# Patient Record
Sex: MALE | Race: WHITE | NOT HISPANIC OR LATINO | URBAN - METROPOLITAN AREA
[De-identification: names, ages, dates, MRNs, and addresses within clinical notes are randomized per-mention and may not be internally consistent; named-entity substitution may affect disease eponyms.]

---

## 2019-05-29 ENCOUNTER — APPOINTMENT (RX ONLY)
Dept: URBAN - METROPOLITAN AREA CLINIC 24 | Facility: CLINIC | Age: 74
Setting detail: DERMATOLOGY
End: 2019-05-29

## 2019-05-29 DIAGNOSIS — L82.1 OTHER SEBORRHEIC KERATOSIS: ICD-10-CM

## 2019-05-29 DIAGNOSIS — L57.0 ACTINIC KERATOSIS: ICD-10-CM

## 2019-05-29 DIAGNOSIS — D22 MELANOCYTIC NEVI: ICD-10-CM

## 2019-05-29 PROBLEM — D22.5 MELANOCYTIC NEVI OF TRUNK: Status: ACTIVE | Noted: 2019-05-29

## 2019-05-29 PROBLEM — I25.10 ATHEROSCLEROTIC HEART DISEASE OF NATIVE CORONARY ARTERY WITHOUT ANGINA PECTORIS: Status: ACTIVE | Noted: 2019-05-29

## 2019-05-29 PROBLEM — D22.39 MELANOCYTIC NEVI OF OTHER PARTS OF FACE: Status: ACTIVE | Noted: 2019-05-29

## 2019-05-29 PROBLEM — M12.9 ARTHROPATHY, UNSPECIFIED: Status: ACTIVE | Noted: 2019-05-29

## 2019-05-29 PROBLEM — D22.71 MELANOCYTIC NEVI OF RIGHT LOWER LIMB, INCLUDING HIP: Status: ACTIVE | Noted: 2019-05-29

## 2019-05-29 PROBLEM — E13.9 OTHER SPECIFIED DIABETES MELLITUS WITHOUT COMPLICATIONS: Status: ACTIVE | Noted: 2019-05-29

## 2019-05-29 PROBLEM — D22.61 MELANOCYTIC NEVI OF RIGHT UPPER LIMB, INCLUDING SHOULDER: Status: ACTIVE | Noted: 2019-05-29

## 2019-05-29 PROCEDURE — 99203 OFFICE O/P NEW LOW 30 MIN: CPT | Mod: 25

## 2019-05-29 PROCEDURE — ? SHAVE REMOVAL

## 2019-05-29 PROCEDURE — 11300 SHAVE SKIN LESION 0.5 CM/<: CPT | Mod: 59

## 2019-05-29 PROCEDURE — 11102 TANGNTL BX SKIN SINGLE LES: CPT

## 2019-05-29 PROCEDURE — ? COUNSELING

## 2019-05-29 PROCEDURE — ? BIOPSY BY SHAVE METHOD

## 2019-05-29 PROCEDURE — ? LIQUID NITROGEN

## 2019-05-29 PROCEDURE — 17000 DESTRUCT PREMALG LESION: CPT | Mod: 59

## 2019-05-29 ASSESSMENT — LOCATION DETAILED DESCRIPTION DERM
LOCATION DETAILED: RIGHT PROXIMAL PRETIBIAL REGION
LOCATION DETAILED: SUPERIOR THORACIC SPINE
LOCATION DETAILED: LEFT MID PREAURICULAR CHEEK
LOCATION DETAILED: LEFT INFERIOR MEDIAL UPPER BACK
LOCATION DETAILED: PERIUMBILICAL SKIN
LOCATION DETAILED: RIGHT LATERAL SHOULDER
LOCATION DETAILED: RIGHT MEDIAL INFERIOR CHEST
LOCATION DETAILED: LEFT MEDIAL FOREHEAD

## 2019-05-29 ASSESSMENT — LOCATION SIMPLE DESCRIPTION DERM
LOCATION SIMPLE: RIGHT PRETIBIAL REGION
LOCATION SIMPLE: LEFT UPPER BACK
LOCATION SIMPLE: RIGHT SHOULDER
LOCATION SIMPLE: CHEST
LOCATION SIMPLE: LEFT FOREHEAD
LOCATION SIMPLE: UPPER BACK
LOCATION SIMPLE: LEFT CHEEK
LOCATION SIMPLE: ABDOMEN

## 2019-05-29 ASSESSMENT — LOCATION ZONE DERM
LOCATION ZONE: LEG
LOCATION ZONE: TRUNK
LOCATION ZONE: FACE
LOCATION ZONE: ARM

## 2019-05-29 NOTE — PROCEDURE: SHAVE REMOVAL
Billing Type: Third-Party Bill
Bill [id number] For Specimen Handling/Conveyance To Laboratory?: no
Size Of Lesion In Cm (Required): 0.5
X Size Of Lesion In Cm (Optional): 0
Was A Bandage Applied: Yes
Biopsy Method: Dermablade
Path Notes (To The Dermatopathologist): Please check margins.
Notification Instructions: Patient will be notified of biopsy results. However, patient instructed to call the office if not contacted within 2 weeks.
Detail Level: Detailed
Anesthesia Volume In Cc: 0.4
Wound Care: Vaseline
Consent was obtained from the patient. The risks and benefits to therapy were discussed in detail. Specifically, the risks of infection, scarring, bleeding, prolonged wound healing, incomplete removal, allergy to anesthesia, nerve injury and recurrence were addressed. Prior to the procedure, the treatment site was clearly identified and confirmed by the patient. All components of Universal Protocol/PAUSE Rule completed.
Medical Necessity Information: It is in your best interest to select a reason for this procedure from the list below. All of these items fulfill various CMS LCD requirements except the new and changing color options.
Hemostasis: Electrodesiccation
Anesthesia Type: 1% lidocaine with epinephrine and a 1:10 solution of 8.4% sodium bicarbonate
Post-Care Instructions: I reviewed with the patient in detail post-care instructions. Patient is to keep the biopsy site dry overnight, and then apply bacitracin twice daily until healed. Patient may apply hydrogen peroxide soaks to remove any crusting.

## 2019-05-29 NOTE — PROCEDURE: LIQUID NITROGEN
Render Post-Care Instructions In Note?: no
Duration Of Freeze Thaw-Cycle (Seconds): 4
Number Of Freeze-Thaw Cycles: 1 freeze-thaw cycle
Detail Level: Simple
Consent: The patient's consent was obtained including but not limited to risks of crusting, scabbing, blistering, scarring, darker or lighter pigmentary change, recurrence, incomplete removal and infection.
Post-Care Instructions: I reviewed with the patient in detail post-care instructions. Patient is to wear sunprotection, and avoid picking at any of the treated lesions. Pt may apply Vaseline to crusted or scabbing areas.

## 2019-05-29 NOTE — PROCEDURE: BIOPSY BY SHAVE METHOD
Destruction After The Procedure: No
Curettage Text: The wound bed was treated with curettage after the biopsy was performed.
Hemostasis: Aluminum Chloride
Dressing: bandage
Silver Nitrate Text: The wound bed was treated with silver nitrate after the biopsy was performed.
Biopsy Type: H and E
Biopsy Method: Dermablade
Detail Level: Detailed
Accession #: pc
Additional Anesthesia Volume In Cc (Will Not Render If 0): 0
Type Of Destruction Used: Curettage
Wound Care: Petrolatum
Electrodesiccation And Curettage Text: The wound bed was treated with electrodesiccation and curettage after the biopsy was performed.
Depth Of Biopsy: dermis
Was A Bandage Applied: Yes
Anesthesia Type: 1% lidocaine with 1:100,000 epinephrine and a 1:6 solution of 8.4% sodium bicarbonate
Cryotherapy Text: The wound bed was treated with cryotherapy after the biopsy was performed.
Anesthesia Volume In Cc: 0.5
Electrodesiccation Text: The wound bed was treated with electrodesiccation after the biopsy was performed.
Billing Type: Third-Party Bill

## 2020-06-05 ENCOUNTER — APPOINTMENT (RX ONLY)
Dept: URBAN - METROPOLITAN AREA CLINIC 24 | Facility: CLINIC | Age: 75
Setting detail: DERMATOLOGY
End: 2020-06-05

## 2020-06-05 DIAGNOSIS — L82.1 OTHER SEBORRHEIC KERATOSIS: ICD-10-CM

## 2020-06-05 DIAGNOSIS — L57.0 ACTINIC KERATOSIS: ICD-10-CM

## 2020-06-05 DIAGNOSIS — D22 MELANOCYTIC NEVI: ICD-10-CM

## 2020-06-05 PROBLEM — H91.90 UNSPECIFIED HEARING LOSS, UNSPECIFIED EAR: Status: ACTIVE | Noted: 2020-06-05

## 2020-06-05 PROBLEM — D22.39 MELANOCYTIC NEVI OF OTHER PARTS OF FACE: Status: ACTIVE | Noted: 2020-06-05

## 2020-06-05 PROBLEM — D22.5 MELANOCYTIC NEVI OF TRUNK: Status: ACTIVE | Noted: 2020-06-05

## 2020-06-05 PROBLEM — D22.71 MELANOCYTIC NEVI OF RIGHT LOWER LIMB, INCLUDING HIP: Status: ACTIVE | Noted: 2020-06-05

## 2020-06-05 PROCEDURE — ? COUNSELING

## 2020-06-05 PROCEDURE — 17003 DESTRUCT PREMALG LES 2-14: CPT

## 2020-06-05 PROCEDURE — ? LIQUID NITROGEN

## 2020-06-05 PROCEDURE — 99214 OFFICE O/P EST MOD 30 MIN: CPT | Mod: 25

## 2020-06-05 PROCEDURE — 17000 DESTRUCT PREMALG LESION: CPT

## 2020-06-05 ASSESSMENT — LOCATION DETAILED DESCRIPTION DERM
LOCATION DETAILED: RIGHT PROXIMAL PRETIBIAL REGION
LOCATION DETAILED: RIGHT MEDIAL FOREHEAD
LOCATION DETAILED: PERIUMBILICAL SKIN
LOCATION DETAILED: LEFT TRAGUS
LOCATION DETAILED: RIGHT MEDIAL INFERIOR CHEST
LOCATION DETAILED: RIGHT LATERAL TEMPLE
LOCATION DETAILED: SUPERIOR THORACIC SPINE
LOCATION DETAILED: LEFT INFERIOR MEDIAL UPPER BACK

## 2020-06-05 ASSESSMENT — LOCATION SIMPLE DESCRIPTION DERM
LOCATION SIMPLE: RIGHT TEMPLE
LOCATION SIMPLE: LEFT EAR
LOCATION SIMPLE: UPPER BACK
LOCATION SIMPLE: CHEST
LOCATION SIMPLE: RIGHT FOREHEAD
LOCATION SIMPLE: ABDOMEN
LOCATION SIMPLE: LEFT UPPER BACK
LOCATION SIMPLE: RIGHT PRETIBIAL REGION

## 2020-06-05 ASSESSMENT — LOCATION ZONE DERM
LOCATION ZONE: FACE
LOCATION ZONE: LEG
LOCATION ZONE: EAR
LOCATION ZONE: TRUNK

## 2020-06-05 NOTE — PROCEDURE: LIQUID NITROGEN
Detail Level: Simple
Number Of Freeze-Thaw Cycles: 1 freeze-thaw cycle
Post-Care Instructions: I reviewed with the patient in detail post-care instructions. Patient is to wear sunprotection, and avoid picking at any of the treated lesions. Pt may apply Vaseline to crusted or scabbing areas.
Render Note In Bullet Format When Appropriate: No
Consent: The patient's consent was obtained including but not limited to risks of crusting, scabbing, blistering, scarring, darker or lighter pigmentary change, recurrence, incomplete removal and infection.
Duration Of Freeze Thaw-Cycle (Seconds): 4

## 2020-12-03 ENCOUNTER — HOSPITAL ENCOUNTER (OUTPATIENT)
Dept: SURGERY | Age: 75
Discharge: HOME | End: 2020-12-03
Attending: ORTHOPAEDIC SURGERY
Payer: MEDICARE

## 2020-12-03 VITALS
BODY MASS INDEX: 23.91 KG/M2 | WEIGHT: 186.3 LBS | HEIGHT: 74 IN | OXYGEN SATURATION: 99 % | HEART RATE: 65 BPM | TEMPERATURE: 97.4 F | DIASTOLIC BLOOD PRESSURE: 83 MMHG | SYSTOLIC BLOOD PRESSURE: 143 MMHG

## 2020-12-03 LAB
ALBUMIN SERPL-MCNC: 4.3 G/DL (ref 3.2–4.6)
ALBUMIN/GLOB SERPL: 1.4 {RATIO} (ref 1.2–3.5)
ALP SERPL-CCNC: 74 U/L (ref 50–136)
ALT SERPL-CCNC: 29 U/L (ref 12–65)
ANION GAP SERPL CALC-SCNC: 6 MMOL/L (ref 7–16)
APPEARANCE UR: CLEAR
APTT PPP: 31.4 SEC (ref 24.1–35.1)
AST SERPL-CCNC: 31 U/L (ref 15–37)
BACTERIA SPEC CULT: NORMAL
BASOPHILS # BLD: 0 K/UL (ref 0–0.2)
BASOPHILS NFR BLD: 1 % (ref 0–2)
BILIRUB SERPL-MCNC: 1 MG/DL (ref 0.2–1.1)
BILIRUB UR QL: NEGATIVE
BUN SERPL-MCNC: 23 MG/DL (ref 8–23)
CALCIUM SERPL-MCNC: 10 MG/DL (ref 8.3–10.4)
CHLORIDE SERPL-SCNC: 103 MMOL/L (ref 98–107)
CO2 SERPL-SCNC: 29 MMOL/L (ref 21–32)
COLOR UR: YELLOW
CREAT SERPL-MCNC: 1.08 MG/DL (ref 0.8–1.5)
DIFFERENTIAL METHOD BLD: NORMAL
EOSINOPHIL # BLD: 0.2 K/UL (ref 0–0.8)
EOSINOPHIL NFR BLD: 3 % (ref 0.5–7.8)
ERYTHROCYTE [DISTWIDTH] IN BLOOD BY AUTOMATED COUNT: 13.3 % (ref 11.9–14.6)
GLOBULIN SER CALC-MCNC: 3.1 G/DL (ref 2.3–3.5)
GLUCOSE SERPL-MCNC: 113 MG/DL (ref 65–100)
GLUCOSE UR STRIP.AUTO-MCNC: NEGATIVE MG/DL
HCT VFR BLD AUTO: 49.2 % (ref 41.1–50.3)
HGB BLD-MCNC: 16 G/DL (ref 13.6–17.2)
HGB UR QL STRIP: NEGATIVE
IMM GRANULOCYTES # BLD AUTO: 0 K/UL (ref 0–0.5)
IMM GRANULOCYTES NFR BLD AUTO: 0 % (ref 0–5)
INR PPP: 0.9
KETONES UR QL STRIP.AUTO: NEGATIVE MG/DL
LEUKOCYTE ESTERASE UR QL STRIP.AUTO: NEGATIVE
LYMPHOCYTES # BLD: 1.7 K/UL (ref 0.5–4.6)
LYMPHOCYTES NFR BLD: 29 % (ref 13–44)
MAGNESIUM SERPL-MCNC: 2 MG/DL (ref 1.8–2.4)
MCH RBC QN AUTO: 30.5 PG (ref 26.1–32.9)
MCHC RBC AUTO-ENTMCNC: 32.5 G/DL (ref 31.4–35)
MCV RBC AUTO: 93.7 FL (ref 79.6–97.8)
MONOCYTES # BLD: 0.4 K/UL (ref 0.1–1.3)
MONOCYTES NFR BLD: 7 % (ref 4–12)
NEUTS SEG # BLD: 3.7 K/UL (ref 1.7–8.2)
NEUTS SEG NFR BLD: 61 % (ref 43–78)
NITRITE UR QL STRIP.AUTO: NEGATIVE
NRBC # BLD: 0 K/UL (ref 0–0.2)
PH UR STRIP: 5 [PH] (ref 5–9)
PLATELET # BLD AUTO: 199 K/UL (ref 150–450)
PMV BLD AUTO: 10.5 FL (ref 9.4–12.3)
POTASSIUM SERPL-SCNC: 4.8 MMOL/L (ref 3.5–5.1)
PROT SERPL-MCNC: 7.4 G/DL (ref 6.3–8.2)
PROT UR STRIP-MCNC: NEGATIVE MG/DL
PROTHROMBIN TIME: 13 SEC (ref 12.5–14.7)
RBC # BLD AUTO: 5.25 M/UL (ref 4.23–5.6)
SERVICE CMNT-IMP: NORMAL
SODIUM SERPL-SCNC: 138 MMOL/L (ref 136–145)
SP GR UR REFRACTOMETRY: 1.02 (ref 1–1.02)
UROBILINOGEN UR QL STRIP.AUTO: 0.2 EU/DL (ref 0.2–1)
WBC # BLD AUTO: 6 K/UL (ref 4.3–11.1)

## 2020-12-03 PROCEDURE — 85025 COMPLETE CBC W/AUTO DIFF WBC: CPT

## 2020-12-03 PROCEDURE — 83735 ASSAY OF MAGNESIUM: CPT

## 2020-12-03 PROCEDURE — 36415 COLL VENOUS BLD VENIPUNCTURE: CPT

## 2020-12-03 PROCEDURE — 87641 MR-STAPH DNA AMP PROBE: CPT

## 2020-12-03 PROCEDURE — 85730 THROMBOPLASTIN TIME PARTIAL: CPT

## 2020-12-03 PROCEDURE — 77030027138 HC INCENT SPIROMETER -A

## 2020-12-03 PROCEDURE — 81003 URINALYSIS AUTO W/O SCOPE: CPT

## 2020-12-03 PROCEDURE — 80053 COMPREHEN METABOLIC PANEL: CPT

## 2020-12-03 PROCEDURE — 85610 PROTHROMBIN TIME: CPT

## 2020-12-03 RX ORDER — UREA 10 %
125 LOTION (ML) TOPICAL DAILY
COMMUNITY
End: 2021-03-18

## 2020-12-03 RX ORDER — MONTELUKAST SODIUM 10 MG/1
10 TABLET ORAL
COMMUNITY

## 2020-12-03 RX ORDER — BISMUTH SUBSALICYLATE 262 MG
1 TABLET,CHEWABLE ORAL DAILY
COMMUNITY

## 2020-12-03 RX ORDER — AZELASTINE 1 MG/ML
1 SPRAY, METERED NASAL EVERY 12 HOURS
COMMUNITY
Start: 2020-11-13

## 2020-12-03 RX ORDER — METFORMIN HYDROCHLORIDE 500 MG/1
1500 TABLET, FILM COATED, EXTENDED RELEASE ORAL DAILY
COMMUNITY

## 2020-12-03 NOTE — PERIOP NOTES
PLEASE CONTINUE TAKING ALL PRESCRIPTION MEDICATIONS UP TO THE DAY OF SURGERY UNLESS OTHERWISE DIRECTED BELOW. DISCONTINUE all vitamins and supplements 7 days prior to surgery. DISCONTINUE Non-Steriodal Anti-Inflammatory (NSAIDS) such as Advil, Ibuprofen, Motrin, Naproxen, Aspirin, and Aleve FIVE days prior to surgery. Home Medications to take  the day of surgery (12/10/2020)   Astelin nasal spray           Home Medications   to Hold   Excedrin=stop 5 days prior on 12/05/2020        Comments   *May take Tylenol up until the day before surgery if needed. *Bring: Nova-hex soap, Incentive Spirometer, Ramipril, Nasal Spray, Metformin ER   *Visitor policy of 1 visitor per patient discussed. Please do not bring home medications with you on the day of surgery unless otherwise directed by your nurse. If you are instructed to bring home medications, please give them to your nurse as they will be administered by the nursing staff. If you have any questions, please call Kelly Treadwell [phone]. A copy of this note was provided to the patient for reference.

## 2020-12-03 NOTE — PERIOP NOTES
Patient verified name and . Order for consent NOT found in EHR; patient verified. Type 3 surgery, walk in assessment complete. Labs per surgeon: CBC, CMP, PT/PTT, MAG, U/A ; results pending. HGB-A1C to be collected dos. Patient instructed to return to the outpatient lab on 2020 for T&C. Labs per anesthesia protocol: no additional labs needed. EKG: last completed on 2020 at [facility]. Will request record via fax. Will have charge nurse follow up. Patient followed by [facility] for CAD. Stress test (19), Echo (19), and Office note (2020) available in EHR for reference. Also most recent PCP office note (2020) available in EHR. Per Dr. Sugar Allen (cardiology) on 2020: \"From a preop standpoint: He is able to achieve 4 METs of activity without any difficulty. Electrocardiogram does not reveal any acute ST abnormalities. I think he is a reasonable risk for shoulder surgery\". Patient requested to use an Incentive Spirometer from home. Instruction sheet given. Patient aware that a negative Covid swab result is required to proceed with surgery; appointments are made by the surgeon office and should test should be collected 7 days prior to surgery. The testing center is located at the . [address], [place]. MRSA/MSSA swab collected; pharmacy to review and dose antibiotic as appropriate. Hospital approved surgical skin cleanser and instructions to return bottle on DOS given per hospital policy. Patient provided with handouts including Guide to Surgery, Pain Management, Hand Hygiene, Blood Transfusion Education, and [place] [facility] Brochure. Patient answered medical/surgical history questions at their best of ability. All prior to admission medications documented in [facility]. Original medication prescription bottles NOT visualized during patient appointment.      Patient instructed to hold all vitamins 3 weeks prior to surgery and NSAIDS 5 days prior to surgery. Patient teach back successful and patient demonstrates knowledge of instruction.

## 2020-12-03 NOTE — PERIOP NOTES
How to Use Your Incentive Spirometer (use 10 times twice a day)      About Your Incentive Spirometer  An incentive spirometer is a device that will expand your lungs by helping you to breathe more deeply and fully. The parts of your incentive spirometer are labeled in Figure 1. Using your incentive spirometer  When youre using your incentive spirometer, make sure to breathe through your mouth. If you breathe through your nose, the incentive spirometer wont work properly. You can hold your nose if you have trouble. DO NOT BLOW INTO THE DEVICE. If you feel dizzy at any time, stop and rest. Try again at a later time. 1. Sit upright in a chair or in bed. Hold the incentive spirometer at eye level. 2. Put the mouthpiece in your mouth and close your lips tightly around it. Slowly breathe out (exhale) completely. 3. Breathe in (inhale) slowly through your mouth as deeply as you can. As you take the breath, you will see the piston rise inside the large column. While the piston rises, the indicator on the right should move upwards. It should stay in between the 2 arrows (see Figure 1). 4. Try to get the piston as high as you can, while keeping the indicator between the arrows. If the indicator doesnt stay between the arrows, youre breathing either too fast or too slow. 5. When you get it as high as you can, hold your breath for 10 seconds, or as long as possible. While youre holding your breath, the piston will slowly fall to the base of the spirometer. 6. Once the piston reaches the bottom of the spirometer, breathe out slowly through your mouth. Rest for a few seconds. 7. Repeat 10 times. Try to get the piston to the same level with each breath. 8. After each set of 10 breaths, try to cough as coughing will help loosen or clear any mucus in your lungs. 9. Put the marker at the level the piston reached on your incentive spirometer. This will be your goal next time.   Repeat these steps every hour that youre awake.   Cover the mouthpiece of the incentive spirometer when you arent using it

## 2020-12-05 PROBLEM — M12.812 ROTATOR CUFF TEAR ARTHROPATHY, LEFT: Status: ACTIVE | Noted: 2020-12-05

## 2020-12-05 PROBLEM — M75.102 ROTATOR CUFF TEAR ARTHROPATHY, LEFT: Status: ACTIVE | Noted: 2020-12-05

## 2020-12-05 NOTE — H&P
[facility] HISTORY AND PHYSICAL    Subjective:     Patient is a 66 y.o. RHD MALE WITH LEFT SHOULDER PAIN. SEE OFFICE NOTE. Patient Active Problem List    Diagnosis Date Noted    Rotator cuff tear arthropathy, left 12/05/2020     Past Medical History:   Diagnosis Date    CAD (coronary artery disease)     coronary artery disease based on a coronary calcium scan=per cardio note dated 12/02/2020; followed by Dr[facility] Aleck Sandifer [facility] cardio     Chronic venous insufficiency     Diabetes ([facility] [place] [address]     Type 2, oral meds, average jd=701, does not check bs regularly at home, hypo s/s <80, last hgba1c- 6.1 on 10/01/2020    GERD (gastroesophageal reflux disease)     occasionally; tums prn     High cholesterol     on med for control     Hypertension     on med for congtrol     Osteoarthritis     Seasonal allergic rhinitis       Past Surgical History:   Procedure Laterality Date    HX CATARACT REMOVAL Bilateral 2013    HX HEENT Left 1975    stapedectomy (left ear)    HX HERNIA REPAIR Right 2002    inguinal    HX HIP REPLACEMENT Right 01/2016    HX HIP REPLACEMENT Left 03/2017    HX KNEE ARTHROSCOPY Right 2009    with meniscus tear repair    HX ROTATOR CUFF REPAIR Right 2015    HX TONSILLECTOMY      HX VASECTOMY  2004      Prior to Admission medications    Medication Sig Start Date End Date Taking? Authorizing Provider   [facility] ER) 500 mg TG24 24 hour tablet Take 1,500 mg by mouth daily (with dinner). Indications: type 2 diabetes mellitus    Provider, Historical   aspirin/acetaminophen/caffeine (EXCEDRIN EXTRA STRENGTH PO) Take 1 Tab by mouth every twelve (12) hours. Provider, Historical   cyanocobalamin (VITAMIN B12) 100 mcg tablet Take 125 mcg by mouth daily. Provider, Historical   montelukast (SINGULAIR) 10 mg tablet Take 10 mg by mouth nightly.  Indications: seasonal runny nose    Provider, Historical   azelastine (ASTELIN) 137 mcg (0.1 %) nasal spray 1 Spray by Both Nostrils route every twelve (12) hours. Take / use AM day of surgery  per anesthesia protocols. 20   Provider, Historical   multivitamin (ONE A DAY) tablet Take 1 Tab by mouth daily. Provider, Historical   COQ10, UBIQUINOL, PO Take 100 mg by mouth daily. Provider, Historical   ramipril (ALTACE) 5 mg capsule Take 5 mg by mouth nightly. Indications: high blood pressure    Provider, Historical   atorvastatin (LIPITOR) 20 mg tablet Take 20 mg by mouth nightly. Indications: high cholesterol    Provider, Historical   psyllium husk, with sugar, 3.4 gram pwpk Take  by mouth daily. 1 tsp daily    Provider, Historical   Omega-3 Fatty Acids 300 mg cap Take 1,200 mg by mouth daily. Provider, Historical   cholecalciferol (VITAMIN D3) 1,000 unit tablet Take 5,000 Units by mouth nightly. Provider, Historical     Allergies   Allergen Reactions    Celebrex [Celecoxib] Rash    Robaxin [Methocarbamol] Rash      Social History     Tobacco Use    Smoking status: Former Smoker     Packs/day: 0.50     Years: 5.00     Pack years: 2.50     Last attempt to quit:      Years since quittin.9    Smokeless tobacco: Former User     Quit date:    Substance Use Topics    Alcohol use: Yes     Comment: socially      Family History   Problem Relation Age of Onset    Heart Disease Mother     Diabetes Mother     High Cholesterol Mother     Blindness Mother       Review of Systems  A comprehensive review of systems was negative except for that written in the HPI. Objective:     No data found. There were no vitals taken for this visit. General:  Alert, cooperative, no distress, appears stated age. Head:  Normocephalic, without obvious abnormality, atraumatic. Back:   Symmetric, no curvature. ROM normal. No CVA tenderness. Lungs:   Clear to auscultation bilaterally. Chest wall:  No tenderness or deformity.    Heart:  Regular rate and rhythm, S1, S2 normal, no murmur, click, rub or gallop. Extremities: Extremities normal, atraumatic, no cyanosis or edema. Pulses: 2+ and symmetric all extremities. Skin: Skin color, texture, turgor normal. No rashes or lesions   Lymph nodes: Cervical, supraclavicular, and axillary nodes normal.   Neurologic: CNII-XII intact. Normal strength, sensation and reflexes throughout. Assessment:     Principal Problem:    Rotator cuff tear arthropathy, left (12/5/2020)        Plan:     The various methods of treatment have been discussed with the patient and family. PATIENT HAS EXHAUSTED NON-OPERATIVE MODALITIES     After consideration of risks, benefits and other options for treatment, the patient has consented to surgical intervention.     SEE OFFICE NOTE    Alannah Valdez MD

## 2020-12-07 NOTE — ADVANCED PRACTICE NURSE
Total Joint Surgery Preoperative Chart Review      Patient ID:  Balbina Gonzalez  [id number]  66 y.o.  1945  Surgeon: Dr. Birt Gosselin  Date of Surgery: 12/10/2020  Procedure: Total Left Shoulder Arthroplasty  Primary Care Physician: Ugo Fonseca [phone]  Specialty Physician(s):      Subjective:   Balbina Gonzalez is a 66 y.o. WHITE OR  male who presents for preoperative evaluation for Total Left Shoulder arthroplasty. This is a preoperative chart review note based on data collected by the nurse at the surgical Pre-Assessment visit.     Past Medical History:   Diagnosis Date    CAD (coronary artery disease)     coronary artery disease based on a coronary calcium scan=per cardio note dated 2020; followed by Dr. Latrice Elizalde at [facility] cardio     Chronic venous insufficiency     Diabetes ([facility] [address]     Type 2, oral meds, average eo=757, does not check bs regularly at home, hypo s/s <80, last hgba1c- 6.1 on 10/01/2020    GERD (gastroesophageal reflux disease)     occasionally; tums prn     High cholesterol     on med for control     Hypertension     on med for congtrol     Osteoarthritis     Seasonal allergic rhinitis       Past Surgical History:   Procedure Laterality Date    HX CATARACT REMOVAL Bilateral     HX HEENT Left     stapedectomy (left ear)    HX HERNIA REPAIR Right     inguinal    HX HIP REPLACEMENT Right 2016    HX HIP REPLACEMENT Left 2017    HX KNEE ARTHROSCOPY Right     with meniscus tear repair    HX ROTATOR CUFF REPAIR Right     HX TONSILLECTOMY      HX VASECTOMY  2004     Family History   Problem Relation Age of Onset    Heart Disease Mother     Diabetes Mother     High Cholesterol Mother     Blindness Mother       Social History     Tobacco Use    Smoking status: Former Smoker     Packs/day: 0.50     Years: 5.00     Pack years: 2.50     Last attempt to quit: 1964     Years since quittin.9    Smokeless tobacco: Former User Quit date: 1973   Substance Use Topics    Alcohol use: Yes     Comment: socially       Prior to Admission medications    Medication Sig Start Date End Date Taking? Authorizing Provider   [facility] ER) 500 mg TG24 24 hour tablet Take 1,500 mg by mouth daily (with dinner). Indications: type 2 diabetes mellitus   Yes Provider, Historical   aspirin/acetaminophen/caffeine (EXCEDRIN EXTRA STRENGTH PO) Take 1 Tab by mouth every twelve (12) hours. Yes Provider, Historical   cyanocobalamin (VITAMIN B12) 100 mcg tablet Take 125 mcg by mouth daily. Yes Provider, Historical   montelukast (SINGULAIR) 10 mg tablet Take 10 mg by mouth nightly. Indications: seasonal runny nose   Yes Provider, Historical   azelastine (ASTELIN) 137 mcg (0.1 %) nasal spray 1 [place] by Both Nostrils route every twelve (12) hours. Take / use AM day of surgery  per anesthesia protocols. 11/13/20  Yes Provider, Historical   multivitamin (ONE A DAY) tablet Take 1 Tab by mouth daily. Yes Provider, Historical   COQ10, UBIQUINOL, PO Take 100 mg by mouth daily. Yes Provider, Historical   ramipril (ALTACE) 5 mg capsule Take 5 mg by mouth nightly. Indications: high blood pressure   Yes Provider, Historical   atorvastatin (LIPITOR) 20 mg tablet Take 20 mg by mouth nightly. Indications: high cholesterol   Yes Provider, Historical   psyllium husk, with sugar, 3.4 gram pwpk Take  by mouth daily. 1 tsp daily   Yes Provider, Historical   Omega-3 Fatty Acids 300 mg cap Take 1,200 mg by mouth daily. Yes Provider, Historical   cholecalciferol (VITAMIN D3) 1,000 unit tablet Take 5,000 Units by mouth nightly. Yes Provider, Historical     Allergies   Allergen Reactions    Celebrex [Celecoxib] Rash    Robaxin [Methocarbamol] Rash          Objective:     Physical Exam:   No data found.     ECG:    EKG Results     None          Data Review:   Labs:   Labs reviewed    Problem List:  )  Patient Active Problem List   Diagnosis Code    Rotator cuff tear arthropathy, left M75.102, [id number]       Total Joint Surgery Pre-Assessment Recommendations:           Recommend continuous saturation monitoring during hospitalization. PEP therapy BID.         Signed By: ASHLI Looney    December 7, 2020

## 2020-12-09 ENCOUNTER — ANESTHESIA EVENT (OUTPATIENT)
Dept: SURGERY | Age: 75
DRG: 483 | End: 2020-12-09
Payer: MEDICARE

## 2020-12-09 ENCOUNTER — HOSPITAL ENCOUNTER (OUTPATIENT)
Dept: LAB | Age: 75
Discharge: HOME | DRG: 483 | End: 2020-12-09
Attending: ORTHOPAEDIC SURGERY
Payer: MEDICARE

## 2020-12-09 VITALS — BODY MASS INDEX: 23.5 KG/M2 | WEIGHT: 189 LBS | HEIGHT: 75 IN

## 2020-12-09 LAB
ABO + RH BLD: NORMAL
BLOOD GROUP ANTIBODIES SERPL: NORMAL
SPECIMEN EXP DATE BLD: NORMAL

## 2020-12-09 PROCEDURE — 86900 BLOOD TYPING SEROLOGIC ABO: CPT

## 2020-12-09 PROCEDURE — 36415 COLL VENOUS BLD VENIPUNCTURE: CPT

## 2020-12-10 ENCOUNTER — HOSPITAL ENCOUNTER (INPATIENT)
Age: 75
LOS: 2 days | Discharge: HOME HEALTH | DRG: 483 | End: 2020-12-12
Attending: ORTHOPAEDIC SURGERY | Admitting: ORTHOPAEDIC SURGERY
Payer: MEDICARE

## 2020-12-10 ENCOUNTER — APPOINTMENT (OUTPATIENT)
Dept: GENERAL RADIOLOGY | Age: 75
DRG: 483 | End: 2020-12-10
Attending: ORTHOPAEDIC SURGERY
Payer: MEDICARE

## 2020-12-10 ENCOUNTER — ANESTHESIA (OUTPATIENT)
Dept: SURGERY | Age: 75
DRG: 483 | End: 2020-12-10
Payer: MEDICARE

## 2020-12-10 PROBLEM — M12.812 ROTATOR CUFF TEAR ARTHROPATHY OF LEFT SHOULDER: Status: ACTIVE | Noted: 2020-12-10

## 2020-12-10 PROBLEM — M75.102 ROTATOR CUFF TEAR ARTHROPATHY OF LEFT SHOULDER: Status: ACTIVE | Noted: 2020-12-10

## 2020-12-10 LAB
EST. AVERAGE GLUCOSE BLD GHB EST-MCNC: 134 MG/DL
GLUCOSE BLD STRIP.AUTO-MCNC: 114 MG/DL (ref 65–100)
HBA1C MFR BLD: 6.3 %

## 2020-12-10 PROCEDURE — 77030012547: Performed by: ORTHOPAEDIC SURGERY

## 2020-12-10 PROCEDURE — 77030031139 HC SUT VCRL2 J&J -A: Performed by: ORTHOPAEDIC SURGERY

## 2020-12-10 PROCEDURE — 77030039425 HC BLD LARYNG TRULITE DISP TELE -A: Performed by: ANESTHESIOLOGY

## 2020-12-10 PROCEDURE — 76210000006 HC OR PH I REC 0.5 TO 1 HR: Performed by: ORTHOPAEDIC SURGERY

## 2020-12-10 PROCEDURE — 74011000250 HC RX REV CODE- 250: Performed by: ANESTHESIOLOGY

## 2020-12-10 PROCEDURE — C1776 JOINT DEVICE (IMPLANTABLE): HCPCS | Performed by: ORTHOPAEDIC SURGERY

## 2020-12-10 PROCEDURE — 82962 GLUCOSE BLOOD TEST: CPT

## 2020-12-10 PROCEDURE — A4565 SLINGS: HCPCS | Performed by: ORTHOPAEDIC SURGERY

## 2020-12-10 PROCEDURE — 74011250636 HC RX REV CODE- 250/636: Performed by: ANESTHESIOLOGY

## 2020-12-10 PROCEDURE — 77030020268 HC MISC GENERAL SUPPLY: Performed by: ORTHOPAEDIC SURGERY

## 2020-12-10 PROCEDURE — 77030035643 HC BLD SAW OSC PRECIS STRY -C: Performed by: ORTHOPAEDIC SURGERY

## 2020-12-10 PROCEDURE — 74011000250 HC RX REV CODE- 250: Performed by: NURSE ANESTHETIST, CERTIFIED REGISTERED

## 2020-12-10 PROCEDURE — 74011250636 HC RX REV CODE- 250/636: Performed by: ORTHOPAEDIC SURGERY

## 2020-12-10 PROCEDURE — 74011000258 HC RX REV CODE- 258: Performed by: ORTHOPAEDIC SURGERY

## 2020-12-10 PROCEDURE — 2709999900 HC NON-CHARGEABLE SUPPLY: Performed by: ORTHOPAEDIC SURGERY

## 2020-12-10 PROCEDURE — 94762 N-INVAS EAR/PLS OXIMTRY CONT: CPT

## 2020-12-10 PROCEDURE — 77030037088 HC TUBE ENDOTRACH ORAL NSL COVD-A: Performed by: ANESTHESIOLOGY

## 2020-12-10 PROCEDURE — 2709999900 HC NON-CHARGEABLE SUPPLY

## 2020-12-10 PROCEDURE — 74011250636 HC RX REV CODE- 250/636: Performed by: NURSE ANESTHETIST, CERTIFIED REGISTERED

## 2020-12-10 PROCEDURE — 77030035257 HC SUT FORC FBR STRND STRY -B: Performed by: ORTHOPAEDIC SURGERY

## 2020-12-10 PROCEDURE — 77030040922 HC BLNKT HYPOTHRM STRY -A: Performed by: ANESTHESIOLOGY

## 2020-12-10 PROCEDURE — 76010000162 HC OR TIME 1.5 TO 2 HR INTENSV-TIER 1: Performed by: ORTHOPAEDIC SURGERY

## 2020-12-10 PROCEDURE — 83036 HEMOGLOBIN GLYCOSYLATED A1C: CPT

## 2020-12-10 PROCEDURE — 73030 X-RAY EXAM OF SHOULDER: CPT

## 2020-12-10 PROCEDURE — 77030003827 HC BIT DRL J&J -B: Performed by: ORTHOPAEDIC SURGERY

## 2020-12-10 PROCEDURE — 74011250637 HC RX REV CODE- 250/637: Performed by: ORTHOPAEDIC SURGERY

## 2020-12-10 PROCEDURE — 77030020163 HC SEAL HEMSTAT HALY -B: Performed by: ORTHOPAEDIC SURGERY

## 2020-12-10 PROCEDURE — C1713 ANCHOR/SCREW BN/BN,TIS/BN: HCPCS | Performed by: ORTHOPAEDIC SURGERY

## 2020-12-10 PROCEDURE — 77030040361 HC SLV COMPR DVT MDII -B: Performed by: ORTHOPAEDIC SURGERY

## 2020-12-10 PROCEDURE — 76010010054 HC POST OP PAIN BLOCK: Performed by: ORTHOPAEDIC SURGERY

## 2020-12-10 PROCEDURE — 77030003602 HC NDL NRV BLK BBMI -B: Performed by: ANESTHESIOLOGY

## 2020-12-10 PROCEDURE — 0RRK00Z REPLACEMENT OF LEFT SHOULDER JOINT WITH REVERSE BALL AND SOCKET SYNTHETIC SUBSTITUTE, OPEN APPROACH: ICD-10-PCS | Performed by: ORTHOPAEDIC SURGERY

## 2020-12-10 PROCEDURE — 65270000029 HC RM PRIVATE

## 2020-12-10 PROCEDURE — 77030002986 HC SUT PROL J&J -A: Performed by: ORTHOPAEDIC SURGERY

## 2020-12-10 PROCEDURE — 76942 ECHO GUIDE FOR BIOPSY: CPT | Performed by: ORTHOPAEDIC SURGERY

## 2020-12-10 PROCEDURE — 76060000034 HC ANESTHESIA 1.5 TO 2 HR: Performed by: ORTHOPAEDIC SURGERY

## 2020-12-10 DEVICE — COMPONENT GLEN FIX DIA10MM SHLDR METAGLENE LNG PEG GLOB: Type: IMPLANTABLE DEVICE | Site: SHOULDER | Status: FUNCTIONAL

## 2020-12-10 DEVICE — SCREW BNE L36MM DIA4.5MM PROX CORT TIB S STL ST LOK FULL: Type: IMPLANTABLE DEVICE | Site: SHOULDER | Status: FUNCTIONAL

## 2020-12-10 DEVICE — COMPONENT GLENOSPHERE ECCENTRIC XTEND 42MM PLUS 4MM: Type: IMPLANTABLE DEVICE | Site: SHOULDER | Status: FUNCTIONAL

## 2020-12-10 DEVICE — CUP HUM DIA42MM +9MM OFFSET STD SHLDR POLYETH DELT XTEND: Type: IMPLANTABLE DEVICE | Site: SHOULDER | Status: FUNCTIONAL

## 2020-12-10 DEVICE — RESTRICTOR CEM DIA12MM UNIV FEM CNL UHMWPE BIOSTP G: Type: IMPLANTABLE DEVICE | Site: SHOULDER | Status: FUNCTIONAL

## 2020-12-10 DEVICE — STEM HUM SZ 2 DIA12MM 155DEG SHLDR CO CHROM ALLY STD LEN: Type: IMPLANTABLE DEVICE | Site: SHOULDER | Status: FUNCTIONAL

## 2020-12-10 DEVICE — CEMENT BNE 20ML 41GM FULL DOSE PMMA W/ TOBRA M VISC RADPQ: Type: IMPLANTABLE DEVICE | Site: SHOULDER | Status: FUNCTIONAL

## 2020-12-10 DEVICE — SCREW BNE L42MM DIA4.5MM PROX CORT TIB S STL ST LOK FULL: Type: IMPLANTABLE DEVICE | Site: SHOULDER | Status: FUNCTIONAL

## 2020-12-10 RX ORDER — LANOLIN ALCOHOL/MO/W.PET/CERES
1 CREAM (GRAM) TOPICAL 2 TIMES DAILY WITH MEALS
Status: DISCONTINUED | OUTPATIENT
Start: 2020-12-11 | End: 2020-12-12 | Stop reason: HOSPADM

## 2020-12-10 RX ORDER — ROCURONIUM BROMIDE 10 MG/ML
INJECTION, SOLUTION INTRAVENOUS AS NEEDED
Status: DISCONTINUED | OUTPATIENT
Start: 2020-12-10 | End: 2020-12-10 | Stop reason: HOSPADM

## 2020-12-10 RX ORDER — EPHEDRINE SULFATE/0.9% NACL/PF 50 MG/5 ML
SYRINGE (ML) INTRAVENOUS AS NEEDED
Status: DISCONTINUED | OUTPATIENT
Start: 2020-12-10 | End: 2020-12-10 | Stop reason: HOSPADM

## 2020-12-10 RX ORDER — SODIUM CHLORIDE 0.9 % (FLUSH) 0.9 %
5-40 SYRINGE (ML) INJECTION EVERY 8 HOURS
Status: DISCONTINUED | OUTPATIENT
Start: 2020-12-10 | End: 2020-12-12 | Stop reason: HOSPADM

## 2020-12-10 RX ORDER — DEXAMETHASONE SODIUM PHOSPHATE 4 MG/ML
INJECTION, SOLUTION INTRA-ARTICULAR; INTRALESIONAL; INTRAMUSCULAR; INTRAVENOUS; SOFT TISSUE AS NEEDED
Status: DISCONTINUED | OUTPATIENT
Start: 2020-12-10 | End: 2020-12-10 | Stop reason: HOSPADM

## 2020-12-10 RX ORDER — ONDANSETRON 2 MG/ML
INJECTION INTRAMUSCULAR; INTRAVENOUS AS NEEDED
Status: DISCONTINUED | OUTPATIENT
Start: 2020-12-10 | End: 2020-12-10 | Stop reason: HOSPADM

## 2020-12-10 RX ORDER — SODIUM CHLORIDE 0.9 % (FLUSH) 0.9 %
5-40 SYRINGE (ML) INJECTION EVERY 8 HOURS
Status: DISCONTINUED | OUTPATIENT
Start: 2020-12-10 | End: 2020-12-10 | Stop reason: HOSPADM

## 2020-12-10 RX ORDER — SODIUM CHLORIDE 9 MG/ML
75 INJECTION, SOLUTION INTRAVENOUS CONTINUOUS
Status: DISPENSED | OUTPATIENT
Start: 2020-12-10 | End: 2020-12-11

## 2020-12-10 RX ORDER — NALOXONE HYDROCHLORIDE 0.4 MG/ML
0.2 INJECTION, SOLUTION INTRAMUSCULAR; INTRAVENOUS; SUBCUTANEOUS AS NEEDED
Status: DISCONTINUED | OUTPATIENT
Start: 2020-12-10 | End: 2020-12-10 | Stop reason: HOSPADM

## 2020-12-10 RX ORDER — HYDROMORPHONE HYDROCHLORIDE 2 MG/1
2 TABLET ORAL
Status: DISCONTINUED | OUTPATIENT
Start: 2020-12-10 | End: 2020-12-12 | Stop reason: HOSPADM

## 2020-12-10 RX ORDER — HYDROMORPHONE HYDROCHLORIDE 2 MG/1
4 TABLET ORAL
Status: DISCONTINUED | OUTPATIENT
Start: 2020-12-10 | End: 2020-12-12 | Stop reason: HOSPADM

## 2020-12-10 RX ORDER — DOCUSATE SODIUM 100 MG/1
100 CAPSULE, LIQUID FILLED ORAL 2 TIMES DAILY
Status: DISCONTINUED | OUTPATIENT
Start: 2020-12-11 | End: 2020-12-12 | Stop reason: HOSPADM

## 2020-12-10 RX ORDER — DEXAMETHASONE SODIUM PHOSPHATE 4 MG/ML
INJECTION, SOLUTION INTRA-ARTICULAR; INTRALESIONAL; INTRAMUSCULAR; INTRAVENOUS; SOFT TISSUE
Status: COMPLETED | OUTPATIENT
Start: 2020-12-10 | End: 2020-12-10

## 2020-12-10 RX ORDER — SODIUM CHLORIDE, SODIUM LACTATE, POTASSIUM CHLORIDE, CALCIUM CHLORIDE 600; 310; 30; 20 MG/100ML; MG/100ML; MG/100ML; MG/100ML
75 INJECTION, SOLUTION INTRAVENOUS CONTINUOUS
Status: DISCONTINUED | OUTPATIENT
Start: 2020-12-10 | End: 2020-12-10 | Stop reason: HOSPADM

## 2020-12-10 RX ORDER — CEFAZOLIN SODIUM/WATER 2 G/20 ML
2 SYRINGE (ML) INTRAVENOUS ONCE
Status: COMPLETED | OUTPATIENT
Start: 2020-12-10 | End: 2020-12-10

## 2020-12-10 RX ORDER — PROMETHAZINE HYDROCHLORIDE 25 MG/1
25 TABLET ORAL
Status: DISCONTINUED | OUTPATIENT
Start: 2020-12-10 | End: 2020-12-12 | Stop reason: HOSPADM

## 2020-12-10 RX ORDER — TEMAZEPAM 15 MG/1
15 CAPSULE ORAL
Status: DISCONTINUED | OUTPATIENT
Start: 2020-12-10 | End: 2020-12-12 | Stop reason: HOSPADM

## 2020-12-10 RX ORDER — HYDROMORPHONE HYDROCHLORIDE 2 MG/ML
0.5 INJECTION, SOLUTION INTRAMUSCULAR; INTRAVENOUS; SUBCUTANEOUS
Status: DISCONTINUED | OUTPATIENT
Start: 2020-12-10 | End: 2020-12-10 | Stop reason: HOSPADM

## 2020-12-10 RX ORDER — HYDROMORPHONE HYDROCHLORIDE 1 MG/ML
1 INJECTION, SOLUTION INTRAMUSCULAR; INTRAVENOUS; SUBCUTANEOUS
Status: DISCONTINUED | OUTPATIENT
Start: 2020-12-10 | End: 2020-12-12 | Stop reason: HOSPADM

## 2020-12-10 RX ORDER — LIDOCAINE HYDROCHLORIDE 20 MG/ML
INJECTION, SOLUTION EPIDURAL; INFILTRATION; INTRACAUDAL; PERINEURAL AS NEEDED
Status: DISCONTINUED | OUTPATIENT
Start: 2020-12-10 | End: 2020-12-10 | Stop reason: HOSPADM

## 2020-12-10 RX ORDER — ESMOLOL HYDROCHLORIDE 10 MG/ML
INJECTION INTRAVENOUS AS NEEDED
Status: DISCONTINUED | OUTPATIENT
Start: 2020-12-10 | End: 2020-12-10 | Stop reason: HOSPADM

## 2020-12-10 RX ORDER — LIDOCAINE HYDROCHLORIDE 10 MG/ML
0.1 INJECTION INFILTRATION; PERINEURAL AS NEEDED
Status: DISCONTINUED | OUTPATIENT
Start: 2020-12-10 | End: 2020-12-10 | Stop reason: HOSPADM

## 2020-12-10 RX ORDER — SODIUM CHLORIDE 0.9 % (FLUSH) 0.9 %
5-40 SYRINGE (ML) INJECTION AS NEEDED
Status: DISCONTINUED | OUTPATIENT
Start: 2020-12-10 | End: 2020-12-10 | Stop reason: HOSPADM

## 2020-12-10 RX ORDER — OXYCODONE AND ACETAMINOPHEN 5; 325 MG/1; MG/1
1 TABLET ORAL AS NEEDED
Status: DISCONTINUED | OUTPATIENT
Start: 2020-12-10 | End: 2020-12-10 | Stop reason: HOSPADM

## 2020-12-10 RX ORDER — PROPOFOL 10 MG/ML
INJECTION, EMULSION INTRAVENOUS AS NEEDED
Status: DISCONTINUED | OUTPATIENT
Start: 2020-12-10 | End: 2020-12-10 | Stop reason: HOSPADM

## 2020-12-10 RX ORDER — GLYCOPYRROLATE 0.2 MG/ML
INJECTION INTRAMUSCULAR; INTRAVENOUS AS NEEDED
Status: DISCONTINUED | OUTPATIENT
Start: 2020-12-10 | End: 2020-12-10 | Stop reason: HOSPADM

## 2020-12-10 RX ORDER — MIDAZOLAM HYDROCHLORIDE 1 MG/ML
2 INJECTION, SOLUTION INTRAMUSCULAR; INTRAVENOUS
Status: COMPLETED | OUTPATIENT
Start: 2020-12-10 | End: 2020-12-10

## 2020-12-10 RX ORDER — NEOSTIGMINE METHYLSULFATE 1 MG/ML
INJECTION, SOLUTION INTRAVENOUS AS NEEDED
Status: DISCONTINUED | OUTPATIENT
Start: 2020-12-10 | End: 2020-12-10 | Stop reason: HOSPADM

## 2020-12-10 RX ORDER — SODIUM CHLORIDE 0.9 % (FLUSH) 0.9 %
5-40 SYRINGE (ML) INJECTION AS NEEDED
Status: DISCONTINUED | OUTPATIENT
Start: 2020-12-10 | End: 2020-12-12 | Stop reason: HOSPADM

## 2020-12-10 RX ORDER — FENTANYL CITRATE 50 UG/ML
100 INJECTION, SOLUTION INTRAMUSCULAR; INTRAVENOUS ONCE
Status: COMPLETED | OUTPATIENT
Start: 2020-12-10 | End: 2020-12-10

## 2020-12-10 RX ORDER — FACIAL-BODY WIPES
10 EACH TOPICAL DAILY PRN
Status: DISCONTINUED | OUTPATIENT
Start: 2020-12-10 | End: 2020-12-12 | Stop reason: HOSPADM

## 2020-12-10 RX ADMIN — SODIUM CHLORIDE, SODIUM LACTATE, POTASSIUM CHLORIDE, AND CALCIUM CHLORIDE 75 ML/HR: 600; 310; 30; 20 INJECTION, SOLUTION INTRAVENOUS at 10:43

## 2020-12-10 RX ADMIN — PHENYLEPHRINE HYDROCHLORIDE 100 MCG: 10 INJECTION INTRAVENOUS at 14:33

## 2020-12-10 RX ADMIN — ROPIVACAINE HYDROCHLORIDE 30 ML: 5 INJECTION, SOLUTION EPIDURAL; INFILTRATION; PERINEURAL at 12:58

## 2020-12-10 RX ADMIN — HYDROMORPHONE HYDROCHLORIDE 4 MG: 2 TABLET ORAL at 21:18

## 2020-12-10 RX ADMIN — LIDOCAINE HYDROCHLORIDE 0.1 ML: 10 INJECTION, SOLUTION INFILTRATION; PERINEURAL at 10:43

## 2020-12-10 RX ADMIN — ONDANSETRON 4 MG: 2 INJECTION INTRAMUSCULAR; INTRAVENOUS at 15:14

## 2020-12-10 RX ADMIN — Medication 1 AMPULE: at 10:06

## 2020-12-10 RX ADMIN — PHENYLEPHRINE HYDROCHLORIDE 100 MCG: 10 INJECTION INTRAVENOUS at 14:24

## 2020-12-10 RX ADMIN — SODIUM CHLORIDE, SODIUM LACTATE, POTASSIUM CHLORIDE, AND CALCIUM CHLORIDE: 600; 310; 30; 20 INJECTION, SOLUTION INTRAVENOUS at 15:46

## 2020-12-10 RX ADMIN — Medication 5 MG: at 14:44

## 2020-12-10 RX ADMIN — DEXAMETHASONE SODIUM PHOSPHATE 4 MG: 4 INJECTION, SOLUTION INTRAMUSCULAR; INTRAVENOUS at 12:58

## 2020-12-10 RX ADMIN — Medication 3 MG: at 15:25

## 2020-12-10 RX ADMIN — PHENYLEPHRINE HYDROCHLORIDE 50 MCG: 10 INJECTION INTRAVENOUS at 14:55

## 2020-12-10 RX ADMIN — ESMOLOL HYDROCHLORIDE 20 MG: 10 INJECTION, SOLUTION INTRAVENOUS at 15:35

## 2020-12-10 RX ADMIN — Medication 10 MG: at 15:06

## 2020-12-10 RX ADMIN — SODIUM CHLORIDE, SODIUM LACTATE, POTASSIUM CHLORIDE, AND CALCIUM CHLORIDE: 600; 310; 30; 20 INJECTION, SOLUTION INTRAVENOUS at 14:19

## 2020-12-10 RX ADMIN — LIDOCAINE HYDROCHLORIDE 100 MG: 20 INJECTION, SOLUTION EPIDURAL; INFILTRATION; INTRACAUDAL; PERINEURAL at 14:10

## 2020-12-10 RX ADMIN — Medication 2 G: at 14:18

## 2020-12-10 RX ADMIN — Medication 5 MG: at 14:55

## 2020-12-10 RX ADMIN — DEXAMETHASONE SODIUM PHOSPHATE 10 MG: 4 INJECTION, SOLUTION INTRAMUSCULAR; INTRAVENOUS at 15:14

## 2020-12-10 RX ADMIN — Medication 10 MG: at 14:33

## 2020-12-10 RX ADMIN — PROPOFOL 170 MG: 10 INJECTION, EMULSION INTRAVENOUS at 14:10

## 2020-12-10 RX ADMIN — ROCURONIUM BROMIDE 40 MG: 10 INJECTION, SOLUTION INTRAVENOUS at 14:10

## 2020-12-10 RX ADMIN — CEFAZOLIN 1 G: 1 INJECTION, POWDER, FOR SOLUTION INTRAMUSCULAR; INTRAVENOUS at 21:17

## 2020-12-10 RX ADMIN — MIDAZOLAM 1 MG: 1 INJECTION INTRAMUSCULAR; INTRAVENOUS at 12:52

## 2020-12-10 RX ADMIN — PHENYLEPHRINE HYDROCHLORIDE 50 MCG: 10 INJECTION INTRAVENOUS at 14:44

## 2020-12-10 RX ADMIN — PHENYLEPHRINE HYDROCHLORIDE 100 MCG: 10 INJECTION INTRAVENOUS at 15:06

## 2020-12-10 RX ADMIN — FENTANYL CITRATE 100 MCG: 50 INJECTION, SOLUTION INTRAMUSCULAR; INTRAVENOUS at 12:53

## 2020-12-10 RX ADMIN — GLYCOPYRROLATE 0.4 MG: 0.2 INJECTION, SOLUTION INTRAMUSCULAR; INTRAVENOUS at 15:25

## 2020-12-10 RX ADMIN — Medication 10 ML: at 21:34

## 2020-12-10 NOTE — PROGRESS NOTES
TRANSFER - OUT REPORT:    Verbal report given to [facility] (name) on Haley Hess  being transferred to 3rd floor ortho (unit) for routine progression of care       Report consisted of patients Situation, Background, Assessment and   Recommendations(SBAR). Information from the following report(s) SBAR, Procedure Summary and MAR was reviewed with the receiving nurse. Lines:   Peripheral IV 12/10/20 Right Hand (Active)   Site Assessment Clean, dry, & intact 12/10/20 1625   Phlebitis Assessment 0 12/10/20 1625   Infiltration Assessment 0 12/10/20 1625   Dressing Status Clean, dry, & intact 12/10/20 1625   Dressing Type Transparent 12/10/20 1625   Hub Color/Line Status Green;Patent 12/10/20 1625        Opportunity for questions and clarification was provided.       Patient transported with:   [employer]

## 2020-12-10 NOTE — ANESTHESIA PROCEDURE NOTES
Peripheral Block    Start time: 12/10/2020 12:54 PM  End time: 12/10/2020 12:58 PM  Performed by: Marzena Buitrago MD  Authorized by: Marzena Buitrago MD       Pre-procedure: Indications: at surgeon's request and post-op pain management    Preanesthetic Checklist: patient identified, risks and benefits discussed, site marked, timeout performed, anesthesia consent given and patient being monitored    Timeout Time: 12:54          Block Type:   Block Type: Interscalene  Laterality:  Left  Monitoring:  Standard ASA monitoring, responsive to questions, oxygen, continuous pulse ox, frequent vital sign checks and heart rate  Injection Technique:  Single shot  Procedures: ultrasound guided and nerve stimulator    Patient Position: seated (lateral decubitus)  Prep: chlorhexidine    Location:  Interscalene  Needle Type:  Stimuplex  Needle Gauge:  22 G  Needle Localization:  Nerve stimulator and ultrasound guidance  Motor Response comment:   Motor Response: minimal motor response >0.4 mA   Medication Injected:  Ropivacaine 0.5% with epinephrine 1:200,000 injection, 30 mL (Mixture components: EPINEPHrine HCl (PF) 1 mg/mL (1 mL) Soln, . 005 mL; ropivacaine (PF) 5 mg/mL (0.5 %) Soln, 1 mL)  dexamethasone (DECADRON) 4 mg/mL injection, 4 mg  Med Admin Time: 12/10/2020 12:58 PM    Assessment:  Number of attempts:  1  Injection Assessment:  Incremental injection every 5 mL, local visualized surrounding nerve on ultrasound, negative aspiration for blood, no intravascular symptoms, no paresthesia and ultrasound image on chart  Patient tolerance:  Patient tolerated the procedure well with no immediate complications  All needles out intact, proc. Tolerated well.

## 2020-12-10 NOTE — DISCHARGE SUMMARY
[facility] Discharge Summary      Patient ID:  Melinda Boeck  [id number]  21 y.o.  1945    Allergies: Celebrex [celecoxib] and Robaxin [methocarbamol]    Admit date: 12/10/2020    Discharge date and time: 12/12/2020    Admitting Physician: Dung Ruiz MD     Discharge Physician: Dung Ruiz MD      * Admission Diagnoses: Other specific arthropathies, not elsewhere classified, left shoulder [M12.812]  Rotator cuff tear arthropathy of left shoulder [M75.102, M12.812]    * Discharge Diagnoses:   Hospital Problems as of 12/12/2020 Never Reviewed          Codes Class Noted - Resolved POA    Rotator cuff tear arthropathy of left shoulder ICD-10-CM: M75.102, M12.812  ICD-9-CM: 716.81  12/10/2020 - Present Unknown        * (Principal) Rotator cuff tear arthropathy, left ICD-10-CM: M75.102, M12.812  ICD-9-CM: 716.81  12/5/2020 - Present Yes              Surgeon: Dung Ruzi MD        * Procedure: Procedure(s):  LEFT SHOULDER ARTHROPLASTY TOTAL REVERSE W/ BICEPS TENODESIS IN COMBINATION W/ LATISSIMUS DORSI AND TERES MAJOR TENDON TRANSFER/ DELTA XTEND GEN/ SCAL           Perioperative Antibiotics: Ancef  _x__                                                Vancomycin  ___          Post Op complications: none      * Discharge Condition: good  Wound appears to be healing without any evidence of infection.          * Discharged to: Home    * Follow-up Care/Discharge instructions:  - Resume pre hospital diet            - Resume home medications per medical continuation form     CONTINUE PHYSICAL THERAPY  Sling left shoulder  - Follow up in office as scheduled       Signed:  Dung Ruiz MD  12/12/2020  9:52 AM

## 2020-12-10 NOTE — H&P
Date of Surgery Update:  Regan Forte was seen and examined. History and physical has been reviewed. The patient has been examined.  There have been no significant clinical changes since the completion of the originally dated History and Physical.    Signed By: Sonia Mcgee MD     December 10, 2020 9:16 AM

## 2020-12-10 NOTE — ANESTHESIA PREPROCEDURE EVALUATION
Relevant Problems   No relevant active problems       Anesthetic History   No history of anesthetic complications            Review of Systems / Medical History  Patient summary reviewed and pertinent labs reviewed    Pulmonary  Within defined limits                 Neuro/Psych   Within defined limits           Cardiovascular    Hypertension: well controlled          Hyperlipidemia    Exercise tolerance: >4 METS     GI/Hepatic/Renal     GERD: well controlled           Endo/Other    Diabetes: well controlled, type 2    Arthritis     Other Findings              Physical Exam    Airway  Mallampati: II  TM Distance: 4 - 6 cm  Neck ROM: normal range of motion   Mouth opening: Normal     Cardiovascular  Regular rate and rhythm,  S1 and S2 normal,  no murmur, click, rub, or gallop  Rhythm: regular  Rate: normal         Dental  No notable dental hx       Pulmonary  Breath sounds clear to auscultation               Abdominal         Other Findings            Anesthetic Plan    ASA: 2  Anesthesia type: general      Post-op pain plan if not by surgeon: peripheral nerve block single    Induction: Intravenous  Anesthetic plan and risks discussed with: Patient and Spouse

## 2020-12-10 NOTE — BRIEF OP NOTE
BRIEF OPERATIVE NOTE    Date of Procedure: 12/10/2020     Preoperative Diagnosis:  ROTATOR CUFF TEAR ARTHROPATHY LEFT SHOULDER    Postoperative Diagnosis:  SAME    Procedure(s): REVERSE LEFT TOTAL SHOULDER ARTHROPLASTY WITH DELTA EXTEND PROSTHESIS, BICEPS TENODESIS, LATISSIMUS DORSI AND TERES MAJOR TENDON TRANSFERS    Surgeon(s) and Role:     * Dilip Kwon MD - Primary         Assistant Staff:  NONE      Surgical Staff:  Circ-1: Darryl Martinez RN  Circ-Relief: Nola Almazan  Scrub Tech-1: Ml Lewis  Scrub Tech-2: Jadyn Guardado  Scrub Tech-3: Sixto WALLACE  Event Time In   Incision Start 1429   Incision Close        Anesthesia:  GENERAL WITH INTERSCALENE BLOCK    Estimated Blood Loss: 100 CC. Complications: NONE    Implants:   Implant Name Type Inv. Item Serial No.  Lot No. LRB No. Used Action   COMPONENT JULIENNE FIX [id number] SHLDR METAGLENE LNG PEG GLOB - [id number]  COMPONENT JULIENNE FIX [id number] SHLDR METAGLENE LNG PEG GLOB [id number] [facility] [employer] [facility] [id number] Left 1 Implanted   COMPONENT GLENOSPHERE ECCENTRIC XTEND 42MM PLUS 4MM - [id number]  COMPONENT GLENOSPHERE ECCENTRIC XTEND 42MM PLUS 4MM [id number] [facility] [employer] [facility] [id number] Left 1 Implanted   SCREW BNE L42MM DIA4. 5MM PROX VIVIANA TIB S STL ST RAPHAEL FULL - [id number]  SCREW BNE L42MM DIA4. 5MM PROX VIVIANA TIB S STL ST RAPHAEL FULL [id number] DEPUY v2 Ratings [facility] [id number] Left 1 Implanted   SCREW BNE L36MM DIA4. 5MM PROX VIVIANA TIB S STL ST RAPHAEL FULL - [id number]  SCREW BNE L36MM DIA4. 5MM PROX VIVIANA TIB S STL ST RAPHAEL FULL [id number] DEPUY v2 Ratings [facility] [id number] Left 2 Implanted   RESTRICTOR ZAIN [id number] UNIV FEM CNL UHMWPE BIOSTP G - [id number]  RESTRICTOR ZAIN [id number] UNIV FEM CNL UHMWPE BIOSTP G [id number] [facility] [employer] [facility] [id number] Left 1 Implanted   STEM HUM SZ 2 [id number] 155DEG SHLDR CO CHROM ALLY STD JONNY - [id number]  STEM HUM SZ 2 [id number] 155DEG SHLDR CO CHROM ALLY STD JONNY [id number] [employer]_ [id number] Left 1 Implanted   CEMENT BONE SIMPLEX P 1/PACK - [id number]  CEMENT BONE SIMPLEX P 1/PACK [id number] [employer] HOW_WD [id number] Left 2 Implanted   CUP HUM [id number] +9MM OFFSET STD [place] [id number]  CUP HUM [id number] +9MM OFFSET STD Chantel [place] [id number] [employer]_ [id number] Left 1 Implanted       Lillian Henry MD

## 2020-12-10 NOTE — PROGRESS NOTES
TRANSFER - IN REPORT:    Verbal report received from Nae Atkins RN on Trina Balderas  being received from PACU for routine post - op      Report consisted of patients Situation, Background, Assessment and   Recommendations(SBAR). Information from the following report(s) SBAR was reviewed with the receiving nurse. Opportunity for questions and clarification was provided. Assessment completed upon patients arrival to unit and care assumed. Oriented to room, bed controls, and how to order meals. Wife at bedside, assisted pt with hearing aid. Kerlix dressing dry and intact to L shoulder. Sling and swath on. SCDs and yellow gripper socks to LEs and instructed not to get up without staff to assist. Ice to shoulder.

## 2020-12-10 NOTE — ANESTHESIA POSTPROCEDURE EVALUATION
Procedure(s):  LEFT SHOULDER ARTHROPLASTY TOTAL REVERSE W/ BICEPS TENODESIS IN COMBINATION W/ LATISSIMUS DORSI AND TERES MAJOR TENDON TRANSFER/ DELTA XTEND GEN/ SCAL.     general    Anesthesia Post Evaluation      Multimodal analgesia: multimodal analgesia used between 6 hours prior to anesthesia start to PACU discharge  Patient location during evaluation: PACU  Patient participation: complete - patient participated  Level of consciousness: sleepy but conscious  Pain management: adequate  Airway patency: patent  Anesthetic complications: no  Cardiovascular status: acceptable  Respiratory status: acceptable  Hydration status: acceptable  Post anesthesia nausea and vomiting:  none      INITIAL Post-op Vital signs:   Vitals Value Taken Time   /69 12/10/2020  5:05 PM   Temp 36.7 °C (98 °F) 12/10/2020  3:55 PM   Pulse 97 12/10/2020  5:05 PM   Resp 16 12/10/2020  5:05 PM   SpO2 97 % 12/10/2020  5:05 PM

## 2020-12-11 ENCOUNTER — HOME HEALTH ADMISSION (OUTPATIENT)
Dept: HOME HEALTH SERVICES | Facility: HOME HEALTH | Age: 75
End: 2020-12-11
Payer: MEDICARE

## 2020-12-11 LAB
ANION GAP SERPL CALC-SCNC: 9 MMOL/L (ref 7–16)
BUN SERPL-MCNC: 24 MG/DL (ref 8–23)
CALCIUM SERPL-MCNC: 8.9 MG/DL (ref 8.3–10.4)
CHLORIDE SERPL-SCNC: 106 MMOL/L (ref 98–107)
CO2 SERPL-SCNC: 24 MMOL/L (ref 21–32)
CREAT SERPL-MCNC: 1.13 MG/DL (ref 0.8–1.5)
ERYTHROCYTE [DISTWIDTH] IN BLOOD BY AUTOMATED COUNT: 13.4 % (ref 11.9–14.6)
GLUCOSE SERPL-MCNC: 156 MG/DL (ref 65–100)
HCT VFR BLD AUTO: 42.1 % (ref 41.1–50.3)
HGB BLD-MCNC: 14 G/DL (ref 13.6–17.2)
MAGNESIUM SERPL-MCNC: 1.6 MG/DL (ref 1.8–2.4)
MCH RBC QN AUTO: 30.3 PG (ref 26.1–32.9)
MCHC RBC AUTO-ENTMCNC: 33.3 G/DL (ref 31.4–35)
MCV RBC AUTO: 91.1 FL (ref 79.6–97.8)
NRBC # BLD: 0 K/UL (ref 0–0.2)
PLATELET # BLD AUTO: 186 K/UL (ref 150–450)
PMV BLD AUTO: 10 FL (ref 9.4–12.3)
POTASSIUM SERPL-SCNC: 4.4 MMOL/L (ref 3.5–5.1)
RBC # BLD AUTO: 4.62 M/UL (ref 4.23–5.6)
SODIUM SERPL-SCNC: 139 MMOL/L (ref 136–145)
WBC # BLD AUTO: 10.8 K/UL (ref 4.3–11.1)

## 2020-12-11 PROCEDURE — 80048 BASIC METABOLIC PNL TOTAL CA: CPT

## 2020-12-11 PROCEDURE — 83735 ASSAY OF MAGNESIUM: CPT

## 2020-12-11 PROCEDURE — 85027 COMPLETE CBC AUTOMATED: CPT

## 2020-12-11 PROCEDURE — 97530 THERAPEUTIC ACTIVITIES: CPT

## 2020-12-11 PROCEDURE — 97110 THERAPEUTIC EXERCISES: CPT

## 2020-12-11 PROCEDURE — 74011000258 HC RX REV CODE- 258: Performed by: ORTHOPAEDIC SURGERY

## 2020-12-11 PROCEDURE — 36415 COLL VENOUS BLD VENIPUNCTURE: CPT

## 2020-12-11 PROCEDURE — 74011250637 HC RX REV CODE- 250/637: Performed by: ORTHOPAEDIC SURGERY

## 2020-12-11 PROCEDURE — 74011250636 HC RX REV CODE- 250/636: Performed by: ORTHOPAEDIC SURGERY

## 2020-12-11 PROCEDURE — 65270000029 HC RM PRIVATE

## 2020-12-11 PROCEDURE — 97161 PT EVAL LOW COMPLEX 20 MIN: CPT

## 2020-12-11 PROCEDURE — 2709999900 HC NON-CHARGEABLE SUPPLY

## 2020-12-11 RX ORDER — MAGNESIUM SULFATE HEPTAHYDRATE 40 MG/ML
2 INJECTION, SOLUTION INTRAVENOUS ONCE
Status: COMPLETED | OUTPATIENT
Start: 2020-12-11 | End: 2020-12-11

## 2020-12-11 RX ORDER — ACETAMINOPHEN 500 MG
1000 TABLET ORAL
Status: DISCONTINUED | OUTPATIENT
Start: 2020-12-11 | End: 2020-12-12 | Stop reason: HOSPADM

## 2020-12-11 RX ADMIN — HYDROMORPHONE HYDROCHLORIDE 4 MG: 2 TABLET ORAL at 03:12

## 2020-12-11 RX ADMIN — CEFAZOLIN 1 G: 1 INJECTION, POWDER, FOR SOLUTION INTRAMUSCULAR; INTRAVENOUS at 05:57

## 2020-12-11 RX ADMIN — FERROUS SULFATE TAB 325 MG (65 MG ELEMENTAL FE) 325 MG: 325 (65 FE) TAB at 08:39

## 2020-12-11 RX ADMIN — DOCUSATE SODIUM 100 MG: 100 CAPSULE, LIQUID FILLED ORAL at 08:39

## 2020-12-11 RX ADMIN — HYDROMORPHONE HYDROCHLORIDE 4 MG: 2 TABLET ORAL at 23:00

## 2020-12-11 RX ADMIN — HYDROMORPHONE HYDROCHLORIDE 4 MG: 2 TABLET ORAL at 19:49

## 2020-12-11 RX ADMIN — DOCUSATE SODIUM 100 MG: 100 CAPSULE, LIQUID FILLED ORAL at 16:13

## 2020-12-11 RX ADMIN — Medication 10 ML: at 22:05

## 2020-12-11 RX ADMIN — PHENOL 1 SPRAY: 1.5 LIQUID ORAL at 09:15

## 2020-12-11 RX ADMIN — Medication 5 ML: at 06:19

## 2020-12-11 RX ADMIN — MAGNESIUM SULFATE HEPTAHYDRATE 2 G: 40 INJECTION, SOLUTION INTRAVENOUS at 08:39

## 2020-12-11 RX ADMIN — ACETAMINOPHEN 1000 MG: 500 TABLET, FILM COATED ORAL at 20:21

## 2020-12-11 RX ADMIN — CEFAZOLIN 1 G: 1 INJECTION, POWDER, FOR SOLUTION INTRAMUSCULAR; INTRAVENOUS at 14:30

## 2020-12-11 RX ADMIN — HYDROMORPHONE HYDROCHLORIDE 4 MG: 2 TABLET ORAL at 09:15

## 2020-12-11 RX ADMIN — PROMETHAZINE HYDROCHLORIDE 25 MG: 25 TABLET ORAL at 16:13

## 2020-12-11 RX ADMIN — HYDROMORPHONE HYDROCHLORIDE 1 MG: 1 INJECTION, SOLUTION INTRAMUSCULAR; INTRAVENOUS; SUBCUTANEOUS at 21:11

## 2020-12-11 RX ADMIN — Medication 10 ML: at 19:52

## 2020-12-11 RX ADMIN — HYDROMORPHONE HYDROCHLORIDE 4 MG: 2 TABLET ORAL at 14:30

## 2020-12-11 RX ADMIN — FERROUS SULFATE TAB 325 MG (65 MG ELEMENTAL FE) 325 MG: 325 (65 FE) TAB at 16:13

## 2020-12-11 NOTE — PROGRESS NOTES
Was up to BR to void and back to recliner. Bandage dry and intact to L shoulder. Sling on. Sensation to L UE starting to return, medicated for pain with dilaudid 4 mg po. L hand with weak movement.

## 2020-12-11 NOTE — PROGRESS NOTES
Problem: Mobility Impaired (Adult and Pediatric)  Goal: *Acute Goals and Plan of Care (Insert Text)  Description: GOALS (1-4 days):  (1.)  Patient will move from supine to sit and sit to supine  in bed with INDEPENDENT. (2.)  Patient will transfer from bed to chair and chair to bed with SUPERVISION using the least restrictive device. (3.)  Patient will ambulate with SUPERVISION for 300 feet with the least restrictive device. (4.)  Patient will be safe with shoulder HEP, (with spouse's help) to increase range of motion per MD orders. 5) pt able to go up & down 3 steps no rail & SBA.  ________________________________________________________________________________________________   Outcome: Progressing Towards Goal     PHYSICAL THERAPY: Initial Assessment, Treatment Day: Day of Assessment, and AM 12/11/2020  INPATIENT: Hospital Day: 2  [place] / [place] / Product Type: Medicare /      NAME/AGE/GENDER: Nolberto Tariq is a 66 y.o. male   PRIMARY DIAGNOSIS: Other specific arthropathies, not elsewhere classified, left shoulder [M12.812]  Rotator cuff tear arthropathy of left shoulder [M75.102, M12.812] Rotator cuff tear arthropathy, left Rotator cuff tear arthropathy, left  Procedure(s) (LRB):  LEFT SHOULDER ARTHROPLASTY TOTAL REVERSE W/ BICEPS TENODESIS IN COMBINATION W/ LATISSIMUS DORSI AND TERES MAJOR TENDON TRANSFER/ DELTA XTEND GEN/ SCAL (Left)  1 Day Post-Op  ICD-10: Treatment Diagnosis:   · Pain in Left Shoulder (M25.512)  · Stiffness of Left Shoulder, Not elsewhere classified (M25.612)  · Difficulty in walking, Not elsewhere classified (R26.2)   Precaution/Allergies:  Celebrex [celecoxib] and Robaxin [methocarbamol]      ASSESSMENT:     Mr. Lety Kirkland presents with left UE pain & stiffness along with mildly unsteady gait & transfers.  This pt will benefit from follow up therapy to help restore safer function & to establish a HEP prior to returning home with caregiver. This section established at most recent assessment   PROBLEM LIST (Impairments causing functional limitations):  1. Decreased [place] with Bed Mobility  2. Decreased [place] with Transfers  3. Decreased [place] with Ambulation   4. Decreased [place] with shoulder HEP   INTERVENTIONS PLANNED: (Benefits and precautions of physical therapy have been discussed with the patient.)  1. Bed Mobility Training  2. Transfer Training  3. Gait Training  4. Therapeutic Exercises per MD orders  5. Modalities for Pain     TREATMENT PLAN: Frequency/Duration: twice daily for duration of hospital stay  Rehabilitation Potential For Stated Goals: Good     RECOMMENDED REHABILITATION/EQUIPMENT: (at time of discharge pending progress): Home Health: Physical Therapy. HISTORY:   History of Present Injury/Illness (Reason for Referral):  Left TSA Reverse  Past Medical History/Comorbidities:   Mr. Hero Lemus  has a past medical history of CAD (coronary artery disease), Chronic venous insufficiency, Diabetes ([facility] [address] GERD (gastroesophageal reflux disease), High cholesterol, Hypertension, Osteoarthritis, and Seasonal allergic rhinitis. Mr. Hero Lemus  has a past surgical history that includes hx hernia repair (Right, 2002); hx vasectomy (2004); hx knee arthroscopy (Right, 2009); hx cataract removal (Bilateral, 2013); hx heent (Left, 1975); hx rotator cuff repair (Right, 2015); hx hip replacement (Right, 01/2016); hx hip replacement (Left, 03/2017); and hx tonsillectomy.   Social History/Living Environment:   Home Environment: Private residence  # Steps to Enter: 3  Rails to Enter: No  One/Two Story Residence: Two story, live on 1st floor  # of Interior Steps: 18  Interior Rails: Right  Lift Chair Available: No  Living Alone: No  Support Systems: Spouse/Significant Other/Partner  Patient Expects to be Discharged to[de-identified] Private residence  Current DME Used/Available at Home: None  Prior Level of Function/Work/Activity:   Pt was independent without an assistive device prior to this admission. Number of Personal Factors/Comorbidities that affect the Plan of Care: 3+: HIGH COMPLEXITY   EXAMINATION:   Most Recent Physical Functioning:   Gross Assessment:  AROM: Generally decreased, functional(right UE & both LE's)  Strength: Generally decreased, functional(right UE & both LE's)  Coordination: Generally decreased, functional(right UE & both LE's)                    Balance:  Sitting: Intact; Without support  Standing: Impaired; With support(IV pole) Bed Mobility:  Supine to Sit: Stand-by assistance  Sit to Supine: ;Stand-by assistance       Transfers:  Sit to Stand: Contact guard assistance  Stand to Sit: Contact guard assistance  Bed to Chair: Contact guard assistance(with IV pole)  Gait:     Speed/Ana Laura: Delayed  Step Length: (equal)  Gait Abnormalities: Decreased step clearance(mild sway)  Distance (ft): 100 Feet (ft)  Assistive Device: (IV pole)  Ambulation - Level of Assistance: Contact guard assistance   Functional Mobility:         Gait/Ambulation:  cga        Transfers:  cga        Bed Mobility:  sba   Body Structures Involved:  1. Joints  2. Muscles Body Functions Affected:  1. Sensory/Pain  2. Movement Related Activities and Participation Affected:  1. General Tasks and Demands  2. Mobility   Number of elements that affect the Plan of Care: 4+: HIGH COMPLEXITY   CLINICAL PRESENTATION:   Presentation: Stable and uncomplicated: LOW COMPLEXITY   CLINICAL DECISION MAKING:   [facility] [place] [place] [facility] -PAC 6 Clicks   Basic Mobility Inpatient Short Form  How much difficulty does the patient currently have. .. Unable A Lot A Little None   1. Turning over in bed (including adjusting bedclothes, sheets and blankets)? [] 1   [] 2   [x] 3   [] 4   2. Sitting down on and standing up from a chair with arms ( e.g., wheelchair, bedside commode, etc.)   [] 1   [] 2   [x] 3   [] 4   3.   Moving from lying on back to sitting on the side of the bed? [] 1   [] 2   [x] 3   [] 4   How much help from another person does the patient currently need. .. Total A Lot A Little None   4. Moving to and from a bed to a chair (including a wheelchair)? [] 1   [] 2   [x] 3   [] 4   5. Need to walk in hospital room? [] 1   [] 2   [x] 3   [] 4   6. Climbing 3-5 steps with a railing? [x] 1   [] 2   [] 3   [] 4   © 2007, Trustees of [address] [place] [address] [address] [address], under license to [employer]. All rights reserved      Score:  Initial: 16 Most Recent: X (Date: -- )    Interpretation of Tool:  Represents activities that are increasingly more difficult (i.e. Bed mobility, Transfers, Gait). Medical Necessity:     · Patient is expected to demonstrate progress in   · strength, range of motion, balance, coordination, and functional technique  ·  to   · decrease assistance required with bed mobility, transfers, gait & HEP  · .  Reason for Services/Other Comments:  · Patient continues to require skilled intervention due to   · Pt not safe with HEP or functional mobility  · . Use of outcome tool(s) and clinical judgement create a POC that gives a: Clear prediction of patient's progress: LOW COMPLEXITY            TREATMENT:   (In addition to Assessment/Re-Assessment sessions the following treatments were rendered)   Pre-treatment Symptoms/Complaints:  pt agreeable  Pain: Initial: numeric scale  Pain Intensity 1: 0  Pain Location 1: Shoulder  Pain Orientation 1: Left  Pain Intervention(s) 1: Repositioned  Post Session: 0/10     Therapeutic Exercise: (15 Minutes):  Exercises per grid below to improve mobility and dynamic movement of arm - left to improve functional endurance . Required minimal verbal cues to promote proper body alignment and promote proper body mechanics.     Assessment/ 15 min     Date:  12/11 Date:   Date:     ACTIVITY/EXERCISE AM PM AM PM AM PM   Gripping 15aa        Wrist Flexion/Extension 15aa        Wrist Ulnar/Radial Deviation Pronation/Supination 15aa        Elbow Flexion/Extension 15aa        Shoulder Flexion/Extension 15p flex to tolerance        Shoulder AB/ADduction         Shoulder IR/ER         Pulleys         Pendulums 15p CW & CCW        Shrugs         Isometric:                 Flexion         Extension         ABduction         ADduction         Biceps/Triceps                  B = bilateral; AA = active assistive; A = active; P = passive  Education:  [x]  Home Exercises  [x]  Sling Application   [x]  Movement Precautions   []  Pulleys   []  Use of Ice   []  Other:   Treatment/Session Assessment:    · Response to Treatment:  tolerated well  · Interdisciplinary Collaboration:   o Registered Nurse  · After treatment position/precautions:   o Up in chair  o Bed/Chair-wheels locked  o Call light within reach  o RN notified   · Compliance with Program/Exercises: Will assess as treatment progresses. · Recommendations/Intent for next treatment session:  Treatment next visit will focus on increasing Mr. De Leon's independence with bed mobility, transfers, gait training, strength/ROM exercises, modalities for pain, and patient education.    Total Treatment Duration:  PT Patient Time In/Time Out  Time In: 9903  Time Out: Shlomo 59, PT

## 2020-12-11 NOTE — PROGRESS NOTES
Orthopedic Joint Progress Note    2020  Admit Date: 12/10/2020  Admit Diagnosis: Other specific arthropathies, not elsewhere classified, left shoulder [M12.812]  Rotator cuff tear arthropathy of left shoulder [M75.102, M12.812]    1 Day Post-Op    Subjective: arm still numb from block, arm got jerked last night     Ted De Leon     Review of Systems: Pertinent items are noted in HPI. Objective:     PT/OT:     PATIENT MOBILITY                           Vital Signs:    Blood pressure 115/72, pulse 94, temperature 97.7 °F (36.5 °C), resp. rate 16, height 6' 2\" (1.88 m), weight 83.5 kg (184 lb), SpO2 95 %.   Temp (24hrs), Av.9 °F (36.6 °C), Min:97.5 °F (36.4 °C), Max:98.6 °F (37 °C)      Pain Control:   Pain Assessment  Pain Scale 1: Numeric (0 - 10)  Pain Intensity 1: 0    Meds:  Current Facility-Administered Medications   Medication Dose Route Frequency    magnesium sulfate 2 g/50 ml IVPB (premix or compounded)  2 g IntraVENous ONCE    0.9% sodium chloride infusion  75 mL/hr IntraVENous CONTINUOUS    sodium chloride (NS) flush 5-40 mL  5-40 mL IntraVENous Q8H    sodium chloride (NS) flush 5-40 mL  5-40 mL IntraVENous PRN    bisacodyL (DULCOLAX) suppository 10 mg  10 mg Rectal DAILY PRN    sodium phosphate (FLEET'S) enema 1 Enema  1 Enema Rectal PRN    promethazine (PHENERGAN) tablet 25 mg  25 mg Oral Q4H PRN    docusate sodium (COLACE) capsule 100 mg  100 mg Oral BID    ferrous sulfate tablet 325 mg  1 Tab Oral BID WITH MEALS    temazepam (RESTORIL) capsule 15 mg  15 mg Oral QHS PRN    HYDROmorphone (DILAUDID) tablet 4 mg  4 mg Oral Q3H PRN    HYDROmorphone (DILAUDID) tablet 2 mg  2 mg Oral Q3H PRN    HYDROmorphone (PF) (DILAUDID) injection 1 mg  1 mg IntraVENous Q1H PRN    ceFAZolin (ANCEF) 1 g in 0.9% sodium chloride (MBP/ADV) 50 mL MBP  1 g IntraVENous Q8H        LAB:    Lab Results   Component Value Date/Time    INR 0.9 2020 11:36 AM     Lab Results   Component Value Date/Time    HGB 14.0 12/11/2020 05:06 AM    HGB 16.0 12/03/2020 11:36 AM       Wound Shoulder Left (Active)   Wound Etiology Surgical 12/10/20 1500   Dressing Status Clean;Dry; Intact 12/10/20 1745   Cleansed Cleansed with saline 12/10/20 1500   Dressing/Treatment Dry dressing 12/10/20 1745   Number of days: 1         Physical Exam:  No significant changes    Assessment:      Principal Problem:    Rotator cuff tear arthropathy, left (12/5/2020)    Active Problems:    Rotator cuff tear arthropathy of left shoulder (12/10/2020)         Plan:     Continue PT/OT/Rehab  Replete magnesium  Check left shoulder xray  Pain control  observe  Continue care  Home Saturday if stable      Patient Expects to be Discharged to[de-identified] Private residence

## 2020-12-11 NOTE — OP NOTES
[address] [address]  OPERATIVE REPORT    Name:  Wolfgang Yoon  MR#:  [id number]  :  1945  ACCOUNT #:  [de-identified]  DATE OF SERVICE:  12/10/2020    PREOPERATIVE DIAGNOSIS:  Rotator cuff tear arthropathy, left shoulder. POSTOPERATIVE DIAGNOSIS:  Rotator cuff tear arthropathy, left shoulder. PROCEDURE PERFORMED:  Reverse left total shoulder arthroplasty with a Delta Xtend prosthesis, biceps tenodesis, latissimus dorsi and teres major tendon transfer. SURGEON:  Salomon Canavan. Jumana Wilcox MD        ANESTHESIA:  General with an interscalene block. COMPLICATIONS:  None. IMPLANTS:  Hardware utilized is a DePuy +10 metaglene, 42 eccentric +4 mm lateralized glenosphere, 42+9 cup, 12.2 stem, 12 mm Biostop G, 36 mm superior, anterior and a 42 mm inferior nonlocking cortical screw. ESTIMATED BLOOD LOSS:  100 mL. PATHOLOGY:  Rotator cuff tear arthropathy with CLEER deformity. CPT CODES:  [id number] and [id number]. ICD-10 CODES:  B72.858. INDICATIONS:  The patient is a 64-year-old gentleman, who has developed recalcitrant left shoulder pain and weakness. Preoperative physical exam, radiographs, and an MR demonstrate rotator cuff tear arthropathy of the left shoulder with CLEER deformity. The patient has exhausted nonoperative modalities and electively admitted for operative intervention. PROCEDURE:  Following identification of the patient, the patient was taken to the operative suite. Following administration of general anesthesia, interscalene block for postop pain control, 2 g of IV Ancef, the patient was very carefully positioned on the operative table in the beach-chair fashion. Care was taken to protect his bilateral total hip arthroplasties. Left shoulder was then prepped and draped in the sterile fashion. A standard deltopectoral incision was identified and marked. InteguSeal was applied. Once InteguSeal was allowed to cure, the skin was incised.   Subcutaneous tissue was then dissected down to the cephalic vein. This was dissected proximally and distally, retracted laterally with deltoid. Pec major and strap muscles were retracted medially. Subdeltoid bursa was released. Axillary nerve was protected. The biceps tendon was identified. It was dissected up through rotator interval.  It was tagged, transected for later tenodesis. The patient had a positive Augustin sign and biceps was brought out to length for later tenodesis. The subscapularis, supra and infraspinatus were torn into teres minor. The remaining subscapularis was released off the lesser tuberosity utilizing a subscapularis peel configuration and tagged. The humerus was very carefully dislocated from the wound. There were marked degenerative changes in the humeral head and glenoid. Proximal cutting guide was assembled. Proximal cut was then made. Circumferential osteophytes were resected. Glenoid retractor was then inserted. Glenoid was then meticulously exposed. Starter wire was then drilled. Glenoid was then drilled and reamed to a depth of +10. The glenoid was then reamed including inferiorly to restore the tilt. The metaglene was reamed to a +10 mm depth. A true +10 metaglene was inserted and secured with a 36 mm superior, anterior and a 42 mm inferior nonlocking cortical screw. Metaglene was stable. A 42 eccentric +4 mm lateralized glenosphere was inserted and secured in the standard fashion. Metaglene and glenosphere were stable. The humerus was then dislocated back from the wound and reamed to accommodate a 12.2 stem. It was noted that a 12.2 stem with 42+9 cup gave excellent stability and mobility. At this point, all trial components were then removed. Latissimus dorsi and teres major tendons were identified in the humeral shaft. They were released, mobilized for later posterior transfer. Axillary and radial nerves were protected. At this point, the humeral canal was irrigated and dried.   A 12 mm Biostop G was then placed distally. Antibiotic-impregnated cement was mixed and inserted in the humeral canal and a 12.2 stem was cemented in the appropriate version. Excessive cement was removed with a curette. Once the cement was allowed to cure, a true 42+9 cup was inserted. Shoulder was reduced. There was excellent stability with excellent mobility. At this point, the subscapularis was repaired using #5 Mersilene sutures in a modified Kvng-Joe type fashion. Latissimus dorsi and teres major tendons were then transferred posteriorly and secured with #5 and #2 Mersilene sutures. Biceps was tenodesed using #5 Mersilene sutures. Arm was put through range of motion and stable. Axillary and radial nerves were intact. The wound was then irrigated. Deltopectoral interval was approximated with #2 Mersilene sutures. Skin was closed with 0 Vicryl figure-of-eight sutures and a 2-0 Prolene subcuticular stitch. A sterile dressing was applied. A sling and swathe was applied. The patient was then transferred to the recovery room in stable condition. MD GIOVANNI [email or web address]  D:  12/10/2020 15:44  T:  12/10/2020 23:40  JOB #:  [id number]  CC:   Melony Scales MD

## 2020-12-11 NOTE — PROGRESS NOTES
Medicated for pain with dilaudid 4 mg po again. Remains in recliner. Sling on. Moving LUE, hand moving much better.

## 2020-12-11 NOTE — PROGRESS NOTES
Problem: Mobility Impaired (Adult and Pediatric)  Goal: *Acute Goals and Plan of Care (Insert Text)  Description: GOALS (1-4 days):  (1.)  Patient will move from supine to sit and sit to supine  in bed with INDEPENDENT. (2.)  Patient will transfer from bed to chair and chair to bed with SUPERVISION using the least restrictive device. (3.)  Patient will ambulate with SUPERVISION for 300 feet with the least restrictive device. (4.)  Patient will be safe with shoulder HEP, (with spouse's help) to increase range of motion per MD orders. 5) pt able to go up & down 3 steps no rail & SBA.  ________________________________________________________________________________________________   Outcome: Progressing Towards Goal     PHYSICAL THERAPY: Daily Note, Treatment Day: Day of Assessment and PM 12/11/2020  INPATIENT: Hospital Day: 2  [place] / [place] / Product Type: Medicare /      NAME/AGE/GENDER: Evelin Posey is a 66 y.o. male   PRIMARY DIAGNOSIS: Other specific arthropathies, not elsewhere classified, left shoulder [M12.812]  Rotator cuff tear arthropathy of left shoulder [M75.102, M12.812] Rotator cuff tear arthropathy, left Rotator cuff tear arthropathy, left  Procedure(s) (LRB):  LEFT SHOULDER ARTHROPLASTY TOTAL REVERSE W/ BICEPS TENODESIS IN COMBINATION W/ LATISSIMUS DORSI AND TERES MAJOR TENDON TRANSFER/ DELTA XTEND GEN/ SCAL (Left)  1 Day Post-Op  ICD-10: Treatment Diagnosis:   · Pain in Left Shoulder (M25.512)  · Stiffness of Left Shoulder, Not elsewhere classified (M25.612)  · Difficulty in walking, Not elsewhere classified (R26.2)   Precaution/Allergies:  Celebrex [celecoxib] and Robaxin [methocarbamol]      ASSESSMENT:     Mr. Jewell Chang presents with left UE pain & stiffness along with mildly unsteady gait & transfers. This pt will benefit from follow up therapy to help restore safer function & to establish a HEP prior to returning home with caregiver.   In pm session pt needed more assist for bed mobility, transfers & gait, also pain control was not as good. Reviewed with pt & spouse HEP while instructing pt not to get up on his own. This section established at most recent assessment   PROBLEM LIST (Impairments causing functional limitations):  1. Decreased [place] with Bed Mobility  2. Decreased [place] with Transfers  3. Decreased [place] with Ambulation   4. Decreased [place] with shoulder HEP   INTERVENTIONS PLANNED: (Benefits and precautions of physical therapy have been discussed with the patient.)  1. Bed Mobility Training  2. Transfer Training  3. Gait Training  4. Therapeutic Exercises per MD orders  5. Modalities for Pain     TREATMENT PLAN: Frequency/Duration: twice daily for duration of hospital stay  Rehabilitation Potential For Stated Goals: Good     RECOMMENDED REHABILITATION/EQUIPMENT: (at time of discharge pending progress): Home Health: Physical Therapy. HISTORY:   History of Present Injury/Illness (Reason for Referral):  Left TSA Reverse  Past Medical History/Comorbidities:   Mr. Susan Aguilar  has a past medical history of CAD (coronary artery disease), Chronic venous insufficiency, Diabetes ([address] GERD (gastroesophageal reflux disease), High cholesterol, Hypertension, Osteoarthritis, and Seasonal allergic rhinitis. Mr. Susan Aguilar  has a past surgical history that includes hx hernia repair (Right, 2002); hx vasectomy (2004); hx knee arthroscopy (Right, 2009); hx cataract removal (Bilateral, 2013); hx heent (Left, 1975); hx rotator cuff repair (Right, 2015); hx hip replacement (Right, 01/2016); hx hip replacement (Left, 03/2017); and hx tonsillectomy.   Social History/Living Environment:   Home Environment: Private residence  # Steps to Enter: 3  Rails to Enter: No  One/Two Story Residence: Two story, live on 1st floor  # of Interior Steps: 18  Interior Rails: Right  Lift Chair Available: No  Living Alone: No  Support Systems: Spouse/Significant Other/Partner  Patient Expects to be Discharged to[de-identified] Private residence  Current DME Used/Available at Home: None  Prior Level of Function/Work/Activity:   Pt was independent without an assistive device prior to this admission. Number of Personal Factors/Comorbidities that affect the Plan of Care: 3+: HIGH COMPLEXITY   EXAMINATION:   Most Recent Physical Functioning:   Gross Assessment:  AROM: Generally decreased, functional(right UE & both LE's)  Strength: Generally decreased, functional(right UE & both LE's)  Coordination: Generally decreased, functional(right UE & both LE's)                    Balance:  Sitting: Intact; Without support  Standing: Impaired; With support(manual) Bed Mobility:  Supine to Sit: Contact guard assistance  Sit to Supine: Contact guard assistance  Scooting: Contact guard assistance       Transfers:  Sit to Stand: Minimum assistance  Stand to Sit: Minimum assistance  Bed to Chair: Minimum assistance  Duration: 28 Minutes(extra time to work through activity noted)  Gait:     Speed/Ana Laura: Delayed  Step Length: Left shortened;Right shortened  Gait Abnormalities: Decreased step clearance; Path deviations;Trunk sway increased  Distance (ft): 40 Feet (ft)  Assistive Device: (none)  Ambulation - Level of Assistance: Minimal assistance   Functional Mobility:         Gait/Ambulation:  min        Transfers:  min        Bed Mobility:  cga   Body Structures Involved:  1. Joints  2. Muscles Body Functions Affected:  1. Sensory/Pain  2. Movement Related Activities and Participation Affected:  1. General Tasks and Demands  2. Mobility   Number of elements that affect the Plan of Care: 4+: HIGH COMPLEXITY   CLINICAL PRESENTATION:   Presentation: Stable and uncomplicated: LOW COMPLEXITY   CLINICAL DECISION MAKIN [facility] [address] [id number] AM-PAC 6 Clicks   Basic Mobility Inpatient Short Form  How much difficulty does the patient currently have. .. Unable A Lot A Little None   1. Turning over in bed (including adjusting bedclothes, sheets and blankets)? [] 1   [] 2   [x] 3   [] 4   2. Sitting down on and standing up from a chair with arms ( e.g., wheelchair, bedside commode, etc.)   [] 1   [] 2   [x] 3   [] 4   3. Moving from lying on back to sitting on the side of the bed? [] 1   [] 2   [x] 3   [] 4   How much help from another person does the patient currently need. .. Total A Lot A Little None   4. Moving to and from a bed to a chair (including a wheelchair)? [] 1   [] 2   [x] 3   [] 4   5. Need to walk in hospital room? [] 1   [] 2   [x] 3   [] 4   6. Climbing 3-5 steps with a railing? [x] 1   [] 2   [] 3   [] 4   © 2007, Trustees of [address] [place] [address], under license to [employer]. All rights reserved      Score:  Initial: 16 Most Recent: X (Date: -- )    Interpretation of Tool:  Represents activities that are increasingly more difficult (i.e. Bed mobility, Transfers, Gait). Medical Necessity:     · Patient is expected to demonstrate progress in   · strength, range of motion, balance, coordination, and functional technique  ·  to   · decrease assistance required with bed mobility, transfers, gait & HEP  · .  Reason for Services/Other Comments:  · Patient continues to require skilled intervention due to   · Pt not safe with HEP or functional mobility  · .    Use of outcome tool(s) and clinical judgement create a POC that gives a: Clear prediction of patient's progress: LOW COMPLEXITY            TREATMENT:   (In addition to Assessment/Re-Assessment sessions the following treatments were rendered)   Pre-treatment Symptoms/Complaints:  Pt felt worse this pm  Pain: Initial: numeric scale  Pain Intensity 1: 4  Pain Location 1: Shoulder  Pain Orientation 1: Left  Pain Intervention(s) 1: Repositioned  Post Session: 4/10     Therapeutic Activity: (  28 Minutes(extra time to work through activity noted) ):  Therapeutic activities including bed mobility, review of HEP noted below, transfers & short distance ambulation to improve mobility, strength, balance, coordination and dynamic movement of arm - bilateral and leg - bilateral to improve functional endurance & stability. Date:  12/11 Date:   Date:     ACTIVITY/EXERCISE AM PM AM PM AM PM   Gripping 15aa 20       Wrist Flexion/Extension 15aa 20       Wrist Ulnar/Radial Deviation         Pronation/Supination 15aa 20       Elbow Flexion/Extension 15aa 20aa       Shoulder Flexion/Extension 15p flex to tolerance 20aa flex to tolerance       Shoulder AB/ADduction         Shoulder IR/ER         Pulleys         Pendulums 15p CW & CCW 20aa CW & CCW       Shrugs         Isometric:                 Flexion         Extension         ABduction         ADduction         Biceps/Triceps                  B = bilateral; AA = active assistive; A = active; P = passive  Education:  [x]  Home Exercises  [x]  Sling Application   [x]  Movement Precautions   []  Pulleys   []  Use of Ice   []  Other:   Treatment/Session Assessment:    · Response to Treatment:  Pt weaker & more painful this pm  · Interdisciplinary Collaboration:   o Registered Nurse  · After treatment position/precautions:   o Supine in bed  o Bed alarm/tab alert on  o Bed/Chair-wheels locked  o Bed in low position  o Caregiver at bedside  o Call light within reach  o RN notified  o Family at bedside   · Compliance with Program/Exercises: Will assess as treatment progresses. · Recommendations/Intent for next treatment session:  Treatment next visit will focus on increasing Mr. Wood independence with bed mobility, transfers, gait training, strength/ROM exercises, modalities for pain, and patient education.    Total Treatment Duration:  PT Patient Time In/Time Out  Time In: 1704  Time Out: 4081 [facility] Darien Baig, PT

## 2020-12-11 NOTE — PROGRESS NOTES
Care Management Interventions  PCP Verified by CM: Yes  Mode of Transport at Discharge: Self  Transition of Care Consult (CM Consult): [address]: Yes  Physical Therapy Consult: Yes  Occupational Therapy Consult: Yes  Confirm Follow Up Transport: Family  The Plan for Transition of Care is Related to the Following Treatment Goals : return to independent function  The Patient and/or Patient Representative was Provided with a Choice of Provider and Agrees with the Discharge Plan?: Yes  Freedom of Choice List was Provided with Basic Dialogue that Supports the Patient's Individualized Plan of Care/Goals, Treatment Preferences and Shares the Quality Data Associated with the Providers?: Yes  Discharge Location  Discharge Placement: Home with home health    Order rec'd to arrange home health. Pt w/o preference towards provider. Referral sent to [facility]. No other needs identified. Will follow until d/c.

## 2020-12-11 NOTE — PROGRESS NOTES
Shift assessment completed via flow sheet. Pt a/o x 4, respirations are even and unlabored. Lung sounds CTA bilaterally. Abdomen is soft and non-tender with active bowel sounds. LUE is still numb, patient has no sensations still, pulses +3 and palpable. No signs of distress or c/o pain at this time. Pt denies any needs at this time. Safety measures in place. Bed low and locked. Call light within reach. Instructed pt to call for assistance. Will continue to monitor.

## 2020-12-11 NOTE — PROGRESS NOTES
12/10/20 2031   Oxygen Therapy   O2 Sat (%) 98 %   Pulse via Oximetry 99 beats per minute   O2 Device Room air   O2 Flow Rate (L/min) 0 l/min   Pt on continuous monitor for HS. Alarm limits set. Pt working on IS.

## 2020-12-11 NOTE — PROGRESS NOTES
Bandage removed. Incision intact with bruising, no drainage, redressed with gauze and tegaderm. In recliner.

## 2020-12-11 NOTE — PROGRESS NOTES
Problem: Falls - Risk of  Goal: *Absence of Falls  Description: Document Jessica Winslow Fall Risk and appropriate interventions in the flowsheet.   Outcome: Progressing Towards Goal  Note: Fall Risk Interventions:            Medication Interventions: Patient to call before getting OOB, Teach patient to arise slowly    Elimination Interventions: Call light in reach, Patient to call for help with toileting needs              Problem: Patient Education: Go to Patient Education Activity  Goal: Patient/Family Education  Outcome: Progressing Towards Goal     Problem: Patient Education: Go to Patient Education Activity  Goal: Patient/Family Education  Outcome: Progressing Towards Goal     Problem: Upper Extremity Surgical Pathway: Day of Surgery  Goal: Off Pathway (Use only if patient is Off Pathway)  Outcome: Progressing Towards Goal  Goal: Activity/Safety  Outcome: Progressing Towards Goal  Goal: Consults, if ordered  Outcome: Progressing Towards Goal  Goal: Diagnostic Test/Procedures  Outcome: Progressing Towards Goal  Goal: Nutrition/Diet  Outcome: Progressing Towards Goal  Goal: Medications  Outcome: Progressing Towards Goal  Goal: Respiratory  Outcome: Progressing Towards Goal  Goal: Treatments/Interventions/Procedures  Outcome: Progressing Towards Goal  Goal: Psychosocial  Outcome: Progressing Towards Goal  Goal: *Demonstrates progressive activity  Outcome: Progressing Towards Goal  Goal: *Optimal pain control at patient's stated goal  Outcome: Progressing Towards Goal  Goal: *Hemodynamically stable  Outcome: Progressing Towards Goal  Goal: *Labs within defined limits  Outcome: Progressing Towards Goal     Problem: Upper Extremity Surgical Pathway: POD 1  Goal: Off Pathway (Use only if patient is Off Pathway)  Outcome: Progressing Towards Goal  Goal: Activity/Safety  Outcome: Progressing Towards Goal  Goal: Diagnostic Test/Procedures  Outcome: Progressing Towards Goal  Goal: Nutrition/Diet  Outcome: Progressing Towards Goal  Goal: Discharge Planning  Outcome: Progressing Towards Goal  Goal: Medications  Outcome: Progressing Towards Goal  Goal: Respiratory  Outcome: Progressing Towards Goal  Goal: Treatments/Interventions/Procedures  Outcome: Progressing Towards Goal  Goal: Psychosocial  Outcome: Progressing Towards Goal     Problem: Upper Extremity Surgical Pathway: Discharge Outcomes  Goal: *Verbalizes name, dosage, time, side effects, and number of days to continue medications  Outcome: Progressing Towards Goal  Goal: *Describes available resources and support systems  Outcome: Progressing Towards Goal  Goal: *Describes follow-up/return visits to physicians  Outcome: Progressing Towards Goal  Goal: *Tolerating diet  Outcome: Progressing Towards Goal  Goal: *Optimal pain control at patient's stated goal  Outcome: Progressing Towards Goal  Goal: *Lungs clear or at baseline  Outcome: Progressing Towards Goal  Goal: *Afebrile  Outcome: Progressing Towards Goal  Goal: *Incision intact without signs of infection, redness, warmth  Outcome: Progressing Towards Goal  Goal: *Absence of deep venous thrombosis signs and symptoms(Stroke Metric)  Outcome: Progressing Towards Goal  Goal: *Active bowel function  Outcome: Progressing Towards Goal  Goal: *Adequate urinary output  Outcome: Progressing Towards Goal  Goal: *Discharge anxiety minimal  Outcome: Progressing Towards Goal  Goal: *Describes available resources and support systems  Outcome: Progressing Towards Goal  Goal: *Labs within defined limits  Outcome: Progressing Towards Goal  Goal: *Hemodynamically stable  Outcome: Progressing Towards Goal  Goal: *Demonstrates ability to don and doff sling/swath/positioning aid  Outcome: Progressing Towards Goal  Goal: *Modified independence with transfers, ambulation on levels with assistance devices, stair climbing, ADL's  Outcome: Progressing Towards Goal  Goal: *Demonstrates independence with home exercise program  Outcome: Progressing Towards Goal

## 2020-12-12 VITALS
HEIGHT: 74 IN | DIASTOLIC BLOOD PRESSURE: 64 MMHG | HEART RATE: 81 BPM | RESPIRATION RATE: 16 BRPM | SYSTOLIC BLOOD PRESSURE: 129 MMHG | WEIGHT: 184 LBS | BODY MASS INDEX: 23.61 KG/M2 | TEMPERATURE: 97.6 F | OXYGEN SATURATION: 94 %

## 2020-12-12 LAB
ANION GAP SERPL CALC-SCNC: 7 MMOL/L (ref 7–16)
BUN SERPL-MCNC: 25 MG/DL (ref 8–23)
CALCIUM SERPL-MCNC: 9 MG/DL (ref 8.3–10.4)
CHLORIDE SERPL-SCNC: 100 MMOL/L (ref 98–107)
CO2 SERPL-SCNC: 28 MMOL/L (ref 21–32)
CREAT SERPL-MCNC: 1.05 MG/DL (ref 0.8–1.5)
ERYTHROCYTE [DISTWIDTH] IN BLOOD BY AUTOMATED COUNT: 13.4 % (ref 11.9–14.6)
GLUCOSE SERPL-MCNC: 123 MG/DL (ref 65–100)
HCT VFR BLD AUTO: 40 % (ref 41.1–50.3)
HGB BLD-MCNC: 13.4 G/DL (ref 13.6–17.2)
MAGNESIUM SERPL-MCNC: 2 MG/DL (ref 1.8–2.4)
MCH RBC QN AUTO: 30.9 PG (ref 26.1–32.9)
MCHC RBC AUTO-ENTMCNC: 33.5 G/DL (ref 31.4–35)
MCV RBC AUTO: 92.4 FL (ref 79.6–97.8)
NRBC # BLD: 0 K/UL (ref 0–0.2)
PLATELET # BLD AUTO: 179 K/UL (ref 150–450)
PMV BLD AUTO: 10.3 FL (ref 9.4–12.3)
POTASSIUM SERPL-SCNC: 4.2 MMOL/L (ref 3.5–5.1)
RBC # BLD AUTO: 4.33 M/UL (ref 4.23–5.6)
SODIUM SERPL-SCNC: 135 MMOL/L (ref 136–145)
WBC # BLD AUTO: 12.6 K/UL (ref 4.3–11.1)

## 2020-12-12 PROCEDURE — 97110 THERAPEUTIC EXERCISES: CPT

## 2020-12-12 PROCEDURE — 83735 ASSAY OF MAGNESIUM: CPT

## 2020-12-12 PROCEDURE — 85027 COMPLETE CBC AUTOMATED: CPT

## 2020-12-12 PROCEDURE — 97116 GAIT TRAINING THERAPY: CPT

## 2020-12-12 PROCEDURE — 2709999900 HC NON-CHARGEABLE SUPPLY

## 2020-12-12 PROCEDURE — 74011250637 HC RX REV CODE- 250/637: Performed by: ORTHOPAEDIC SURGERY

## 2020-12-12 PROCEDURE — 80048 BASIC METABOLIC PNL TOTAL CA: CPT

## 2020-12-12 PROCEDURE — 36415 COLL VENOUS BLD VENIPUNCTURE: CPT

## 2020-12-12 RX ADMIN — DOCUSATE SODIUM 100 MG: 100 CAPSULE, LIQUID FILLED ORAL at 08:32

## 2020-12-12 RX ADMIN — HYDROMORPHONE HYDROCHLORIDE 4 MG: 2 TABLET ORAL at 08:32

## 2020-12-12 RX ADMIN — ACETAMINOPHEN 1000 MG: 500 TABLET, FILM COATED ORAL at 03:14

## 2020-12-12 RX ADMIN — HYDROMORPHONE HYDROCHLORIDE 4 MG: 2 TABLET ORAL at 12:06

## 2020-12-12 RX ADMIN — FERROUS SULFATE TAB 325 MG (65 MG ELEMENTAL FE) 325 MG: 325 (65 FE) TAB at 08:32

## 2020-12-12 RX ADMIN — HYDROMORPHONE HYDROCHLORIDE 4 MG: 2 TABLET ORAL at 03:14

## 2020-12-12 RX ADMIN — ACETAMINOPHEN 500 MG: 500 TABLET, FILM COATED ORAL at 08:36

## 2020-12-12 NOTE — PROGRESS NOTES
Discharge instructions provided to pt. Chance given to ask questions and answers provided. IV removed, tip intact. Dressing changed per MD ordered.

## 2020-12-12 NOTE — PROGRESS NOTES
Shift assessment complete. Pt resting in bed. A&Ox4. Surgical dsg to left shoulder c/d/i. Radial pulses +2 and palpable. Pt medicated with Dilaudid 4 mg PO. IV capped and patent. Bed in lowest position, call light within reach, side rails x3. Encouraged to call for help when needed.

## 2020-12-12 NOTE — PROGRESS NOTES
Problem: Falls - Risk of  Goal: *Absence of Falls  Description: Document Gustabo Russell Fall Risk and appropriate interventions in the flowsheet.   Outcome: Progressing Towards Goal  Note: Fall Risk Interventions:            Medication Interventions: Bed/chair exit alarm    Elimination Interventions: Call light in reach              Problem: Upper Extremity Surgical Pathway: Discharge Outcomes  Goal: *Verbalizes name, dosage, time, side effects, and number of days to continue medications  Outcome: Progressing Towards Goal  Goal: *Describes available resources and support systems  Outcome: Progressing Towards Goal  Goal: *Describes follow-up/return visits to physicians  Outcome: Progressing Towards Goal  Goal: *Tolerating diet  Outcome: Progressing Towards Goal  Goal: *Optimal pain control at patient's stated goal  Outcome: Progressing Towards Goal  Goal: *Lungs clear or at baseline  Outcome: Progressing Towards Goal  Goal: *Afebrile  Outcome: Progressing Towards Goal  Goal: *Incision intact without signs of infection, redness, warmth  Outcome: Progressing Towards Goal  Goal: *Absence of deep venous thrombosis signs and symptoms(Stroke Metric)  Outcome: Progressing Towards Goal  Goal: *Active bowel function  Outcome: Progressing Towards Goal  Goal: *Adequate urinary output  Outcome: Progressing Towards Goal  Goal: *Discharge anxiety minimal  Outcome: Progressing Towards Goal  Goal: *Describes available resources and support systems  Outcome: Progressing Towards Goal  Goal: *Labs within defined limits  Outcome: Progressing Towards Goal  Goal: *Hemodynamically stable  Outcome: Progressing Towards Goal  Goal: *Demonstrates ability to don and doff sling/swath/positioning aid  Outcome: Progressing Towards Goal  Goal: *Modified independence with transfers, ambulation on levels with assistance devices, stair climbing, ADL's  Outcome: Progressing Towards Goal  Goal: *Demonstrates independence with home exercise program  Outcome: Progressing Towards Goal

## 2020-12-12 NOTE — PROGRESS NOTES
2020         Post Op day: 2 Days Post-Op     Admit Date: 12/10/2020  Admit Diagnosis: Other specific arthropathies, not elsewhere classified, left shoulder [M12.812]; Rotator cuff tear arthropathy of left shoulder [M75.102, M12.812]    Subjective: Doing well, No complaints, No SOB, No Chest Pain, No Nausea or Vomitting. Weight Bearing Status: WBAT  BMP:  Recent Labs     20  0336 20  0506   CREA 1.05 1.13   BUN 25* 24*   * 139   K 4.2 4.4    106   CO2 28 24   AGAP 7 9   * 156*     Patient Vitals for the past 8 hrs:   BP Temp Pulse Resp SpO2   20 0655 129/64 97.6 °F (36.4 °C) 81 16 94 %   20 0315 (!) 143/72 98.3 °F (36.8 °C) 84 16 96 %     Temp (24hrs), Av.2 °F (36.8 °C), Min:97.6 °F (36.4 °C), Max:99 °F (37.2 °C)    CBC:  Recent Labs     20  0336 20  0506   WBC 12.6* 10.8   HGB 13.4* 14.0   HCT 40.0* 42.1    186       Microbiology:     All Micro Results     None        Objective: Vital Signs are Stable, No Acute Distress, Alert and Oriented  Dressing is Dry,  Neurovascular exam is normal.    Assessment:  Patient Active Problem List   Diagnosis Code    Rotator cuff tear arthropathy, left M75.102, M12.812    Rotator cuff tear arthropathy of left shoulder M75.102, M12.812       Plan: Continue Physical Therapy  Monitor Hbg and labs. Dispo soon    Signed By: Susu Mroley MDHISTORY OF PRESENT ILLNESS  Faizan Jara is a 66 y.o. male.   HPI    ROS    Physical Exam    ASSESSMENT and PLAN

## 2020-12-12 NOTE — DISCHARGE INSTRUCTIONS
Patient Education     DISCHARGE SUMMARY from Nurse    PATIENT INSTRUCTIONS:    After general anesthesia or intravenous sedation, for 24 hours or while taking prescription Narcotics:  · Limit your activities  · Do not drive and operate hazardous machinery  · Do not make important personal or business decisions  · Do  not drink alcoholic beverages  · If you have not urinated within 8 hours after discharge, please contact your surgeon on call. Report the following to your surgeon:  · Excessive pain, swelling, redness or odor of or around the surgical area  · Temperature over 100.5  · Nausea and vomiting lasting longer than 4 hours or if unable to take medications  · Any signs of decreased circulation or nerve impairment to extremity: change in color, persistent  numbness, tingling, coldness or increase pain  · Any questions    What to do at Home:  Recommended activity: Activity as tolerated    *  Please give a list of your current medications to your Primary Care Provider. *  Please update this list whenever your medications are discontinued, doses are      changed, or new medications (including over-the-counter products) are added. *  Please carry medication information at all times in case of emergency situations. These are general instructions for a healthy lifestyle:    No smoking/ No tobacco products/ Avoid exposure to second hand smoke  Surgeon General's Warning:  Quitting smoking now greatly reduces serious risk to your health. Obesity, smoking, and sedentary lifestyle greatly increases your risk for illness    A healthy diet, regular physical exercise & weight monitoring are important for maintaining a healthy lifestyle    You may be retaining fluid if you have a history of heart failure or if you experience any of the following symptoms:  Weight gain of 3 pounds or more overnight or 5 pounds in a week, increased swelling in our hands or feet or shortness of breath while lying flat in bed. Please call your doctor as soon as you notice any of these symptoms; do not wait until your next office visit. The discharge information has been reviewed with the {PATIENT PARENT GUARDIAN:[id number]}. The {PATIENT PARENT GUARDIAN:19786} verbalized understanding. Discharge medications reviewed with the {Dishcarge meds reviewed OQDJ:60369} and appropriate educational materials and side effects teaching were provided. ___________________________________________________________________________________________________________________________________     Shoulder Replacement Surgery: What to Expect at Home  Your Recovery     Shoulder replacement surgery replaces the worn parts of your shoulder joint. When you leave the hospital, your arm will be in a sling. It will be helpful if there is someone to help you at home for the next few weeks or until you have more energy and can move around better. You will go home with a bandage and stitches, staples, tissue glue, or tape strips. You can remove the bandage when your doctor tells you to. If you have staples, your doctor will remove them in 10 to 21 days. If you have stitches that are not the type that dissolve, your doctor will remove them in 10 to 14 days. Glue or tape strips will fall off on their own over time. You may still have some mild pain, and the area may be swollen for several months after surgery. Your doctor will give you medicine for the pain. A physical therapist will show you what exercises to do at home. You will continue the rehabilitation program (rehab) you started in the hospital. The better you do with your rehab exercises, the sooner you will get your strength and movement back. Depending on your job, you may be able to go back to work as early as 2 to 3 weeks after surgery, as long as you avoid certain arm movements, such as lifting. It takes at least 6 months to return to full activity.   In the future, make sure to let all health professionals know about your artificial shoulder so they will know how to care for you. This care sheet gives you a general idea about how long it will take for you to recover. But each person recovers at a different pace. Follow the steps below to get better as quickly as possible. How can you care for yourself at home? Activity    · Rest when you feel tired. You may take a nap, but don't stay in bed all day.     · Work with your physical therapist to learn the best way to exercise.     · You will have a sling to wear at night. And it's a good idea to also put a small stack of folded sheets or towels under your upper arm while you are in bed to keep your arm from dropping too far back.     · Your arm should stay next to your body or in front of it for several weeks, both while you are up and during sleep.     · Don't lift anything with the affected arm for 6 weeks.     · Ask your doctor when you can drive again.     · Ask your doctor when it is okay for you to have sex.     · Your doctor may advise you to give up activities that put stress on that shoulder. This includes sports such as weight lifting or tennis, unless your tennis arm was not the one affected. Diet    · By the time you leave the hospital, you will probably be eating your normal diet. If your stomach is upset, try bland, low-fat foods like plain rice, broiled chicken, toast, and yogurt. Your doctor may recommend that you take iron and vitamin supplements.     · Drink plenty of fluids (unless your doctor tells you not to).     · You may notice that your bowel movements are not regular right after your surgery. This is common. Try to avoid constipation and straining with bowel movements. You may want to take a fiber supplement every day. If you have not had a bowel movement after a couple of days, ask your doctor about taking a mild laxative. Medicines    · Your doctor will tell you if and when you can restart your medicines.  He or she will also give you instructions about taking any new medicines.     · If you take aspirin or some other blood thinner, ask your doctor if and when to start taking it again. Make sure that you understand exactly what your doctor wants you to do.     · Be safe with medicines. Take pain medicines exactly as directed. ? If the doctor gave you a prescription medicine for pain, take it as prescribed. ? If you are not taking a prescription pain medicine, ask your doctor if you can take an over-the-counter medicine.     · If you think your pain medicine is making you sick to your stomach:  ? Take your medicine after meals (unless your doctor has told you not to). ? Ask your doctor for a different pain medicine.     · If your doctor prescribed antibiotics, take them as directed. Don't stop taking them just because you feel better. You need to take the full course of antibiotics.     · If you take a blood thinner, be sure you get instructions about how to take your medicine safely. Blood thinners can cause serious bleeding problems. Incision care    · If your doctor told you how to care for your cut (incision), follow your doctor's instructions. You will have a dressing over the cut. A dressing helps the incision heal and protects it. Your doctor will tell you how to take care of this.     · If you did not get instructions, follow this general advice:  ? If you have strips of tape on the cut the doctor made, leave the tape on for a week or until it falls off.  ? If you have stitches or staples, your doctor will tell you when to come back to have them removed. ? If you have skin adhesive on the cut, leave it on until it falls off. Skin adhesive is also called glue or liquid stitches. ? Change the bandage every day. ? Wash the area daily with warm water, and pat it dry. Don't use hydrogen peroxide or alcohol. They can slow healing.   ? You may cover the area with a gauze bandage if it oozes fluid or rubs against clothing. ? You may shower 24 to 48 hours after surgery. Pat the incision dry. Don't swim or take a bath for the first 2 weeks, or until your doctor tells you it is okay. Exercise    · Shoulder rehabilitation is a series of exercises you do after your surgery. This helps you get back your shoulder's range of motion and strength. You will work with your doctor and physical therapist to plan this exercise program. To get the best results, you need to do the exercises correctly and as often and as long as your doctor tells you. Ice    · For pain, put ice or a cold pack on the area for 10 to 20 minutes at a time. Put a thin cloth between the ice and your skin. Follow-up care is a key part of your treatment and safety. Be sure to make and go to all appointments, and call your doctor if you are having problems. It's also a good idea to know your test results and keep a list of the medicines you take. When should you call for help? Call 911 anytime you think you may need emergency care. For example, call if:    · You have severe trouble breathing.     · You have symptoms of a blood clot in your lung (called a pulmonary embolism). These may include:  ? Sudden chest pain. ? Trouble breathing. ? Coughing up blood. Call your doctor now or seek immediate medical care if:    · You have severe or increasing pain.     · You have symptoms of infection, such as:  ? Increased pain, swelling, warmth, or redness. ? Red streaks or pus. ? A fever.     · You have tingling, weakness, or numbness in your arm.     · Your arm turns cold or changes color.     · You have symptoms of a blood clot in your leg (called a deep vein thrombosis). These may include:  ? Pain in the calf, back of the knee, thigh, or groin. ? Redness and swelling in the leg or groin. Watch closely for changes in your health, and be sure to contact your doctor if:    · You do not get better as expected. Where can you learn more?   Go to http://www.gray.com/  Enter Z154 in the search box to learn more about \"Shoulder Replacement Surgery: What to Expect at Home. \"  Current as of: March 2, 2020               Content Version: 12.6  © 8664-3796 [employer]. Care instructions adapted under license by [employer] (which disclaims liability or warranty for this information). If you have questions about a medical condition or this instruction, always ask your healthcare professional. [address] any warranty or liability for your use of this information.

## 2020-12-12 NOTE — PROGRESS NOTES
Problem: Mobility Impaired (Adult and Pediatric)  Goal: *Acute Goals and Plan of Care (Insert Text)  Description: GOALS (1-4 days):  (1.)  Patient will move from supine to sit and sit to supine  in bed with INDEPENDENT. (2.)  Patient will transfer from bed to chair and chair to bed with SUPERVISION using the least restrictive device. (3.)  Patient will ambulate with SUPERVISION for 300 feet with the least restrictive device. (4.)  Patient will be safe with shoulder HEP, (with spouse's help) to increase range of motion per MD orders. Met 12/12  5) pt able to go up & down 3 steps no rail & SBA. Met 12/12  ________________________________________________________________________________________________   Outcome: Progressing Towards Goal     PHYSICAL THERAPY: Daily Note, Treatment Day: 1st and AM 12/12/2020  INPATIENT: Hospital Day: 3  [place] / [place] / Product Type: Medicare /      NAME/AGE/GENDER: Blanca Celmente is a 66 y.o. male   PRIMARY DIAGNOSIS: Other specific arthropathies, not elsewhere classified, left shoulder [M12.812]  Rotator cuff tear arthropathy of left shoulder [M75.102, M12.812] Rotator cuff tear arthropathy, left Rotator cuff tear arthropathy, left  Procedure(s) (LRB):  LEFT SHOULDER ARTHROPLASTY TOTAL REVERSE W/ BICEPS TENODESIS IN COMBINATION W/ LATISSIMUS DORSI AND TERES MAJOR TENDON TRANSFER/ DELTA XTEND GEN/ SCAL (Left)  2 Days Post-Op  ICD-10: Treatment Diagnosis:   · Pain in Left Shoulder (M25.512)  · Stiffness of Left Shoulder, Not elsewhere classified (M25.612)  · Difficulty in walking, Not elsewhere classified (R26.2)   Precaution/Allergies:  Celebrex [celecoxib] and Robaxin [methocarbamol]      ASSESSMENT:     Mr. Andie Faustin reviewed HEP without difficulty. Pt was more steady with functional mobility. Pt is understanding of expectations & precautions.   This section established at most recent assessment   PROBLEM LIST (Impairments causing functional limitations):  1. Decreased [place] with Bed Mobility  2. Decreased [place] with Transfers  3. Decreased [place] with Ambulation   4. Decreased [place] with shoulder HEP   INTERVENTIONS PLANNED: (Benefits and precautions of physical therapy have been discussed with the patient.)  1. Bed Mobility Training  2. Transfer Training  3. Gait Training  4. Therapeutic Exercises per MD orders  5. Modalities for Pain     TREATMENT PLAN: Frequency/Duration: twice daily for duration of hospital stay  Rehabilitation Potential For Stated Goals: Good     RECOMMENDED REHABILITATION/EQUIPMENT: (at time of discharge pending progress): Home Health: Physical Therapy. HISTORY:   History of Present Injury/Illness (Reason for Referral):  Left TSA Reverse  Past Medical History/Comorbidities:   Mr. Stephanie Yanes  has a past medical history of CAD (coronary artery disease), Chronic venous insufficiency, Diabetes ([address] GERD (gastroesophageal reflux disease), High cholesterol, Hypertension, Osteoarthritis, and Seasonal allergic rhinitis. Mr. Stephanie Yanes  has a past surgical history that includes hx hernia repair (Right, 2002); hx vasectomy (2004); hx knee arthroscopy (Right, 2009); hx cataract removal (Bilateral, 2013); hx heent (Left, 1975); hx rotator cuff repair (Right, 2015); hx hip replacement (Right, 01/2016); hx hip replacement (Left, 03/2017); and hx tonsillectomy. Social History/Living Environment:   Home Environment: Private residence  # Steps to Enter: 3  Rails to Enter: No  One/Two Story Residence: Two story, live on 1st floor  # of Interior Steps: 18  Interior Rails: Right  Lift Chair Available: No  Living Alone: No  Support Systems: Spouse/Significant Other/Partner  Patient Expects to be Discharged to[de-identified] Private residence  Current DME Used/Available at Home: None  Prior Level of Function/Work/Activity:   Pt was independent without an assistive device prior to this admission.    Number of Personal Factors/Comorbidities that affect the Plan of Care: 3+: HIGH COMPLEXITY   EXAMINATION:   Most Recent Physical Functioning:   Gross Assessment:  AROM: Generally decreased, functional(right UE & both LE's)  Strength: Generally decreased, functional(right UE & both LE's)  Coordination: Generally decreased, functional(right UE & both LE's)                    Balance:  Sitting: Intact; Without support  Standing: Impaired; Without support Bed Mobility:  Supine to Sit: (NT)  Sit to Supine: (NT)  Scooting: Supervision       Transfers:  Sit to Stand: Stand-by assistance  Stand to Sit: Stand-by assistance  Bed to Chair: Stand-by assistance  Gait:     Speed/Ana Laura: Delayed  Step Length: Left shortened;Right shortened  Gait Abnormalities: Decreased step clearance(mild sway)  Distance (ft): 250 Feet (ft)  Assistive Device: (none)  Ambulation - Level of Assistance: Stand-by assistance  Number of Stairs Trained: 4  Stairs - Level of Assistance: Stand-by assistance  Rail Use: Right   Duration: 11 Minutes   Functional Mobility:         Gait/Ambulation:  sba        Transfers:  sba        Bed Mobility:  NT   Body Structures Involved:  1. Joints  2. Muscles Body Functions Affected:  1. Sensory/Pain  2. Movement Related Activities and Participation Affected:  1. General Tasks and Demands  2. Mobility   Number of elements that affect the Plan of Care: 4+: HIGH COMPLEXITY   CLINICAL PRESENTATION:   Presentation: Stable and uncomplicated: LOW COMPLEXITY   CLINICAL DECISION MAKIN [facility] [address] AM-PAC 6 Clicks   Basic Mobility Inpatient Short Form  How much difficulty does the patient currently have. .. Unable A Lot A Little None   1. Turning over in bed (including adjusting bedclothes, sheets and blankets)? [] 1   [] 2   [x] 3   [] 4   2. Sitting down on and standing up from a chair with arms ( e.g., wheelchair, bedside commode, etc.)   [] 1   [] 2   [x] 3   [] 4   3.   Moving from lying on back to sitting on the side of the bed? [] 1   [] 2   [x] 3   [] 4   How much help from another person does the patient currently need. .. Total A Lot A Little None   4. Moving to and from a bed to a chair (including a wheelchair)? [] 1   [] 2   [x] 3   [] 4   5. Need to walk in hospital room? [] 1   [] 2   [x] 3   [] 4   6. Climbing 3-5 steps with a railing? [x] 1   [] 2   [] 3   [] 4   © 2007, Trustees of [address] [place] [address] [address] [address], under license to [employer]. All rights reserved      Score:  Initial: 16 Most Recent: X (Date: -- )    Interpretation of Tool:  Represents activities that are increasingly more difficult (i.e. Bed mobility, Transfers, Gait). Medical Necessity:     · Patient is expected to demonstrate progress in   · strength, range of motion, balance, coordination, and functional technique  ·  to   · decrease assistance required with bed mobility, transfers, gait & HEP  · .  Reason for Services/Other Comments:  · Patient continues to require skilled intervention due to   · Pt not safe with HEP or functional mobility  · . Use of outcome tool(s) and clinical judgement create a POC that gives a: Clear prediction of patient's progress: LOW COMPLEXITY            TREATMENT:   (In addition to Assessment/Re-Assessment sessions the following treatments were rendered)   Pre-treatment Symptoms/Complaints:  Pt doing much better this am  Pain: Initial: numeric scale  Pain Intensity 1: 3  Pain Location 1: Shoulder  Pain Orientation 1: Left  Pain Intervention(s) 1: Exercise  Post Session: 3/10     Gait Training (11 Minutes):  Gait training to improve and/or restore physical functioning as related to mobility, strength, balance, coordination and dynamic movement of leg - left to improve functional gait. Ambulated 250 Feet (ft) with Stand-by assistance using a (none) and minimal cues related to their stride length and heel strike to promote proper body alignment, promote proper body posture and promote proper body mechanics.  Therapeutic Exercise: (12 Minutes):  Exercises per grid below to improve mobility and dynamic movement of leg - left to improve functional endurance. Date:  12/11 Date:  12/12 Date:     ACTIVITY/EXERCISE AM PM AM PM AM PM   Gripping 15aa 20 25      Wrist Flexion/Extension 15aa 20 25      Wrist Ulnar/Radial Deviation         Pronation/Supination 15aa 20 25      Elbow Flexion/Extension 15aa 20aa 25      Shoulder Flexion/Extension 15p flex to tolerance 20aa flex to tolerance 25aa flex to tolerance      Shoulder AB/ADduction         Shoulder IR/ER         Pulleys         Pendulums 15p CW & CCW 20aa CW & CCW 25aa CW & CCW      Shrugs         Isometric:                 Flexion         Extension         ABduction         ADduction         Biceps/Triceps                  B = bilateral; AA = active assistive; A = active; P = passive  Education:  [x]  Home Exercises  [x]  Sling Application   [x]  Movement Precautions   []  Pulleys   []  Use of Ice   []  Other:   Treatment/Session Assessment:    · Response to Treatment:  Pt ready for DC  · Interdisciplinary Collaboration:   o Registered Nurse  · After treatment position/precautions:   o Up in chair  o Bed/Chair-wheels locked  o Call light within reach  o RN notified   · Compliance with Program/Exercises: Will assess as treatment progresses. · Recommendations/Intent for next treatment session:  Treatment next visit will focus on increasing Mr. De Leon's independence with bed mobility, transfers, gait training, strength/ROM exercises, modalities for pain, and patient education.    Total Treatment Duration:  PT Patient Time In/Time Out  Time In: 0945  Time Out: Tico Ngo PT

## 2020-12-12 NOTE — PROGRESS NOTES
Care Management Interventions  PCP Verified by CM:  Yes  Mode of Transport at Discharge: Self  Transition of Care Consult (CM Consult): [address]: Yes  Physical Therapy Consult: Yes  Occupational Therapy Consult: Yes  Confirm Follow Up Transport: Family  The Plan for Transition of Care is Related to the Following Treatment Goals : return to independent function  The Patient and/or Patient Representative was Provided with a Choice of Provider and Agrees with the Discharge Plan?: Yes  Freedom of Choice List was Provided with Basic Dialogue that Supports the Patient's Individualized Plan of Care/Goals, Treatment Preferences and Shares the Quality Data Associated with the Providers?: Yes  Discharge Location  Discharge Placement: Home with home health

## 2020-12-12 NOTE — PROGRESS NOTES
Shift assessment completed via flow sheet. Pt a/o x 4, respirations are even and unlabored. Lung sounds CTA bilaterally. Abdomen is soft and non-tender and non-distended. Patient's LUE has sensation present but tingling. Radial pulse +3, able to move fingers. No signs of distress at this time. Pt c/o pain 6/10. Administered PRN Dilaudid 4 mg PO per MD orders. Safety measures in place. Bed low and locked. Call light within reach. Instructed pt to call for assistance. Will continue to monitor.

## 2020-12-14 ENCOUNTER — HOME CARE VISIT (OUTPATIENT)
Dept: SCHEDULING | Facility: HOME HEALTH | Age: 75
End: 2020-12-14
Payer: MEDICARE

## 2020-12-14 VITALS
DIASTOLIC BLOOD PRESSURE: 76 MMHG | RESPIRATION RATE: 14 BRPM | TEMPERATURE: 98.5 F | HEART RATE: 82 BPM | SYSTOLIC BLOOD PRESSURE: 124 MMHG

## 2020-12-14 PROCEDURE — G0151 HHCP-SERV OF PT,EA 15 MIN: HCPCS

## 2020-12-14 PROCEDURE — 3331090002 HH PPS REVENUE DEBIT

## 2020-12-14 PROCEDURE — 400013 HH SOC

## 2020-12-14 PROCEDURE — 3331090001 HH PPS REVENUE CREDIT

## 2020-12-14 NOTE — DISCHARGE SUMMARY
[facility] SUMMARY    Name:  Nawaf Shen  MR#:  [id number]  :  1945  ACCOUNT #:  [de-identified]  ADMIT DATE:  12/10/2020  DISCHARGE DATE:  2020    ADMISSION DIAGNOSIS:  Rotator cuff tear arthropathy, left shoulder. DISCHARGE DIAGNOSIS:  Rotator cuff tear arthropathy, left shoulder. Please see H and P, operative summary, and consult for details. HOSPITAL COURSE:  The patient is a 69-year-old gentleman, who was admitted on 12/10/2020, underwent an uncomplicated reverse left total shoulder arthroplasty with a Delta Xtend prosthesis, biceps tenodesis, latissimus dorsi and teres major tendon transfer. On postoperative day #1, he was afebrile, vital signs were stable. His hemoglobin was 14.0; potassium was 4.4; magnesium was 1.6, it was repleted. He was having significant pain on postoperative day #1 requiring IV and oral narcotic pain medication. He felt a pop in his shoulder postop. Postop xray demonstrated normal alignment. On postoperative day #2, he was afebrile, vital signs were stable. All labs were within normal limits. His pain was well controlled. He was discharged home on postoperative day #2. He will continue therapy on the outside and follow up in my office in 2 weeks. MD GIOVANNI [email or web address]  D:  2020 10:17  T:  2020 21:33  JOB #:  [id number]  CC:   Bee Portillo MD

## 2020-12-15 PROCEDURE — 3331090002 HH PPS REVENUE DEBIT

## 2020-12-15 PROCEDURE — 3331090001 HH PPS REVENUE CREDIT

## 2020-12-16 PROCEDURE — 3331090001 HH PPS REVENUE CREDIT

## 2020-12-16 PROCEDURE — 3331090002 HH PPS REVENUE DEBIT

## 2020-12-17 ENCOUNTER — TRANSCRIBE ORDER (OUTPATIENT)
Dept: SCHEDULING | Age: 75
End: 2020-12-17

## 2020-12-17 ENCOUNTER — HOSPITAL ENCOUNTER (OUTPATIENT)
Dept: CT IMAGING | Age: 75
Discharge: HOME | End: 2020-12-17
Attending: ORTHOPAEDIC SURGERY
Payer: MEDICARE

## 2020-12-17 DIAGNOSIS — R06.02 SHORTNESS OF BREATH: Primary | ICD-10-CM

## 2020-12-17 DIAGNOSIS — R06.02 SHORTNESS OF BREATH: ICD-10-CM

## 2020-12-17 DIAGNOSIS — R79.81 LOW OXYGEN SATURATION: ICD-10-CM

## 2020-12-17 PROCEDURE — 74011000258 HC RX REV CODE- 258: Performed by: ORTHOPAEDIC SURGERY

## 2020-12-17 PROCEDURE — 71260 CT THORAX DX C+: CPT

## 2020-12-17 PROCEDURE — 74011000636 HC RX REV CODE- 636: Performed by: ORTHOPAEDIC SURGERY

## 2020-12-17 PROCEDURE — 3331090001 HH PPS REVENUE CREDIT

## 2020-12-17 PROCEDURE — 3331090002 HH PPS REVENUE DEBIT

## 2020-12-17 RX ORDER — SODIUM CHLORIDE 0.9 % (FLUSH) 0.9 %
10 SYRINGE (ML) INJECTION
Status: COMPLETED | OUTPATIENT
Start: 2020-12-17 | End: 2020-12-17

## 2020-12-17 RX ADMIN — SODIUM CHLORIDE 100 ML: 900 INJECTION, SOLUTION INTRAVENOUS at 14:13

## 2020-12-17 RX ADMIN — Medication 10 ML: at 14:13

## 2020-12-17 RX ADMIN — IOPAMIDOL 80 ML: 755 INJECTION, SOLUTION INTRAVENOUS at 14:13

## 2020-12-18 ENCOUNTER — HOME CARE VISIT (OUTPATIENT)
Dept: SCHEDULING | Facility: HOME HEALTH | Age: 75
End: 2020-12-18
Payer: MEDICARE

## 2020-12-18 ENCOUNTER — HOME CARE VISIT (OUTPATIENT)
Dept: HOME HEALTH SERVICES | Facility: HOME HEALTH | Age: 75
End: 2020-12-18
Payer: MEDICARE

## 2020-12-18 VITALS
DIASTOLIC BLOOD PRESSURE: 60 MMHG | TEMPERATURE: 97.9 F | HEART RATE: 84 BPM | SYSTOLIC BLOOD PRESSURE: 116 MMHG | RESPIRATION RATE: 18 BRPM

## 2020-12-18 PROCEDURE — 3331090002 HH PPS REVENUE DEBIT

## 2020-12-18 PROCEDURE — G0157 HHC PT ASSISTANT EA 15: HCPCS

## 2020-12-18 PROCEDURE — 3331090001 HH PPS REVENUE CREDIT

## 2020-12-19 ENCOUNTER — HOME CARE VISIT (OUTPATIENT)
Dept: HOME HEALTH SERVICES | Facility: HOME HEALTH | Age: 75
End: 2020-12-19
Payer: MEDICARE

## 2020-12-19 PROCEDURE — 3331090001 HH PPS REVENUE CREDIT

## 2020-12-19 PROCEDURE — 3331090002 HH PPS REVENUE DEBIT

## 2020-12-20 PROCEDURE — 3331090001 HH PPS REVENUE CREDIT

## 2020-12-20 PROCEDURE — 3331090002 HH PPS REVENUE DEBIT

## 2020-12-21 ENCOUNTER — HOME CARE VISIT (OUTPATIENT)
Dept: SCHEDULING | Facility: HOME HEALTH | Age: 75
End: 2020-12-21
Payer: MEDICARE

## 2020-12-21 VITALS
RESPIRATION RATE: 18 BRPM | OXYGEN SATURATION: 99 % | DIASTOLIC BLOOD PRESSURE: 75 MMHG | TEMPERATURE: 96.6 F | HEART RATE: 84 BPM | SYSTOLIC BLOOD PRESSURE: 140 MMHG

## 2020-12-21 PROCEDURE — G0152 HHCP-SERV OF OT,EA 15 MIN: HCPCS

## 2020-12-21 PROCEDURE — 3331090001 HH PPS REVENUE CREDIT

## 2020-12-21 PROCEDURE — 3331090002 HH PPS REVENUE DEBIT

## 2020-12-22 ENCOUNTER — HOME CARE VISIT (OUTPATIENT)
Dept: SCHEDULING | Facility: HOME HEALTH | Age: 75
End: 2020-12-22
Payer: MEDICARE

## 2020-12-22 PROCEDURE — G0151 HHCP-SERV OF PT,EA 15 MIN: HCPCS

## 2020-12-22 PROCEDURE — 3331090003 HH PPS REVENUE ADJ

## 2020-12-22 PROCEDURE — 3331090001 HH PPS REVENUE CREDIT

## 2020-12-22 PROCEDURE — 3331090002 HH PPS REVENUE DEBIT

## 2020-12-23 VITALS
DIASTOLIC BLOOD PRESSURE: 72 MMHG | TEMPERATURE: 97.8 F | HEART RATE: 66 BPM | OXYGEN SATURATION: 98 % | SYSTOLIC BLOOD PRESSURE: 118 MMHG | RESPIRATION RATE: 18 BRPM

## 2020-12-28 ENCOUNTER — HOSPITAL ENCOUNTER (OUTPATIENT)
Dept: PHYSICAL THERAPY | Age: 75
Discharge: HOME | End: 2020-12-28
Payer: MEDICARE

## 2020-12-28 PROCEDURE — 97140 MANUAL THERAPY 1/> REGIONS: CPT

## 2020-12-28 PROCEDURE — 97162 PT EVAL MOD COMPLEX 30 MIN: CPT

## 2020-12-28 NOTE — THERAPY EVALUATION
Binta Trudy : 1945 [place] Secondary: Morris Jacob at [facility] [address] [facility], [address] [place] [address],[address], [address] [phone]   [phone] OUTPATIENT PHYSICAL THERAPY:Initial Assessment 2020 ICD-10: Treatment Diagnosis: Stiffness of left shoulder, not elsewhere classified M25. 612, Muscle weakness (generalized) M62. 81,Z96. 612 - Presence of left artificial shoulder joint. Precautions/Allergies:  
Celebrex [celecoxib] and Robaxin [methocarbamol] TREATMENT PLAN: 
Effective Dates: 2020 TO 3/28/2021 (90 days). Frequency/Duration: 2 times a week for 90 Day(s) MEDICAL/REFERRING DIAGNOSIS: 
L shoulder DATE OF ONSET: 12/10/20 REFERRING PHYSICIAN: Sandy Kwon MD MD Orders: eval and treat delta protocol,  Hep,ROM, strengthening Return MD Appointment: [id number] INITIAL ASSESSMENT:  Mr. Barrie Edwards presents 18 days following reverse L shoulder replacement. Pt is having difficulty with positioning and has decreased ROM, strengthening and use of L UE following procedure as expected. He is a good candidate for PT for rehabilitation following this surgery. PROBLEM LIST (Impacting functional limitations): 1. Decreased Strength 2. Increased Pain 3. Decreased Activity Tolerance 4. Decreased Flexibility/Joint Mobility 5. Decreased [place] with Home Exercise Program INTERVENTIONS PLANNED: (Treatment may consist of any combination of the following) 1. Cold 2. Home Exercise Program (HEP) 3. Manual Therapy 4. Range of Motion (ROM) 5. Therapeutic Exercise/Strengthening GOALS: (Goals have been discussed and agreed upon with patient.) Short-Term Functional Goals: Time Frame: 3 weeks 1. Shoulder PROM 120 or greater, elbow ext to 0. 
2. Improved sleep with better positioning. 3. Independent with HEP Discharge Goals: Time Frame: 8-12 weeks 1. Elevation of UE to 120 2. Independent with HEP to maintain improvements and return to gym workout 3. Improve DASH to 25 or better indicating improved function. 4.  
 
OUTCOME MEASURE:  
Tool Used: Disabilities of the Arm, Shoulder and Hand (DASH) Questionnaire - Quick Version Score:  Initial: 39/55  Most Recent: X/55 (Date: -- ) Interpretation of Score: The DASH is designed to measure the activities of daily living in person's with upper extremity dysfunction or pain. Each section is scored on a 1-5 scale, 5 representing the greatest disability. The scores of each section are added together for a total score of 55. MEDICAL NECESSITY:  
· Patient is expected to demonstrate progress in strength, range of motion, coordination and functional technique to return function of L UE. Aura Damon REASON FOR SERVICES/OTHER COMMENTS: 
· Patient continues to require skilled intervention due to recent L reverse total shoulder replacement. Aura Damon Total Duration: 
  
 
Rehabilitation Potential For Stated Goals: Good Regarding Ted De Leon's therapy, I certify that the treatment plan above will be carried out by a therapist or under their direction. Thank you for this referral, Klaina Barbosa, PT Referring Physician Signature: Jelly Winslow MD _______________________________ Date _____________ PAIN/SUBJECTIVE:  
Initial:   2/10 now   5/10 at night Post Session:  Not rated but improved after game ready HISTORY:  
History of Injury/Illness (Reason for Referral): 
Pt had L reverse total shoulder Past Medical History/Comorbidities: Mr. Saadia Lebron  has a past medical history of CAD (coronary artery disease), Chronic venous insufficiency, Diabetes ([address] GERD (gastroesophageal reflux disease), High cholesterol, Hypertension, Osteoarthritis, and Seasonal allergic rhinitis. Mr. Saadia Lebron  has a past surgical history that includes hx hernia repair (Right, 2002); hx vasectomy (2004); hx knee arthroscopy (Right, 2009); hx cataract removal (Bilateral, 2013); hx heent (Left, 1975); hx rotator cuff repair (Right, 2015); hx hip replacement (Right, 01/2016); hx hip replacement (Left, 03/2017); and hx tonsillectomy. Social History/Living Environment:  
  lives with wife in home with some steps Prior Level of Function/Work/Activity: 
Works out at gym with weights daily until surgery Ambulatory/Rehab Services H2 Model Falls Risk Assessment Risk Factors: 
     No Risk Factors Identified Ability to Rise from Chair: 
     (0)  Ability to rise in a single movement Falls Prevention Plan: No modifications necessary Total: (5 or greater = High Risk): 0  
©2010 [facility] [address] Patent #[id number]. Federal Law prohibits the replication, distribution or use without written permission from [facility] Current Medications:   
  
Current Outpatient Medications:  
  HYDROmorphone (DILAUDID) 2 mg tablet, Take 2 mg by mouth every four (4) hours as needed for Pain., Disp: , Rfl:  
  acetaminophen (TYLENOL) 500 mg tablet, Take 1,000 mg by mouth every six (6) hours as needed for Pain., Disp: , Rfl:  
  omega-3 fatty acids-fish oil (Fish Oil) 360-1,200 mg cap, Take 1 Cap by mouth daily. , Disp: , Rfl:  
  metFORMIN (GLUMETZA ER) 500 mg TG24 24 hour tablet, Take 1,500 mg by mouth daily. Indications: type 2 diabetes mellitus, Disp: , Rfl:  
  cyanocobalamin (VITAMIN B12) 100 mcg tablet, Take 125 mcg by mouth daily. , Disp: , Rfl:  
   montelukast (SINGULAIR) 10 mg tablet, Take 10 mg by mouth nightly. Indications: seasonal runny nose, Disp: , Rfl:  
  azelastine (ASTELIN) 137 mcg (0.1 %) nasal spray, 1 [place] by Both Nostrils route every twelve (12) hours. . , Disp: , Rfl:  
  multivitamin (ONE A DAY) tablet, Take 1 Tab by mouth daily. , Disp: , Rfl:  
  COQ10, UBIQUINOL, PO, Take 100 mg by mouth daily. , Disp: , Rfl:  
  ramipril (ALTACE) 5 mg capsule, Take 5 mg by mouth nightly. Indications: high blood pressure, Disp: , Rfl:  
  atorvastatin (LIPITOR) 20 mg tablet, Take 20 mg by mouth nightly. Indications: high cholesterol, Disp: , Rfl:  
  psyllium husk, with sugar, 3.4 gram pwpk, Take  by mouth daily. 1 tsp daily, Disp: , Rfl:  
  cholecalciferol (VITAMIN D3) 1,000 unit tablet, Take 5,000 Units by mouth nightly., Disp: , Rfl:   
 
States he is not taking Dilaudid but has new prescription for gabapentin Date Last Reviewed:  Dec 28 2020 Number of Personal Factors/Comorbidities that affect the Plan of Care: 1-2: MODERATE COMPLEXITY EXAMINATION:  
Observation/Orthostatic Postural Assessment:   
      Independent into clinic out of sling. Sitting posture- forward head and rounded thoracic spine. Palpation:  Tender in biceps, triceps, ant shoulder, 
       
ROM:   
      PROM supine- elevation 100, ER 30,  abd 90 Elbow - flex-105  Ext lacks 20 to zero Strength:   
      Not tested Body Structures Involved: 1. Bones 2. Joints 3. Muscles Body Functions Affected: 1. Neuromusculoskeletal 
2. Movement Related Activities and Participation Affected: 1. General Tasks and Demands 2. Self Care Number of elements (examined above) that affect the Plan of Care: 3: MODERATE COMPLEXITY CLINICAL PRESENTATION:  
Presentation: Evolving clinical presentation with changing clinical characteristics: MODERATE COMPLEXITY CLINICAL DECISION MAKING:  
 Use of outcome tool(s) and clinical judgement create a POC that gives a: Questionable prediction of patient's progress: MODERATE COMPLEXITY

## 2020-12-29 ENCOUNTER — HOSPITAL ENCOUNTER (OUTPATIENT)
Dept: PHYSICAL THERAPY | Age: 75
Discharge: HOME | End: 2020-12-29
Payer: MEDICARE

## 2020-12-29 NOTE — PROGRESS NOTES
Luana Bhavna  : 1945  [place] / Product Type: Medicare /  [address]  at [facility]  [address] [facility], [address] [place] [address],[address], [place] [address].  [phone]   [phone]       OUTPATIENT PHYSICAL THERAPY: Cancellation 2020     ICD-10: Treatment Diagnosis: Stiffness of left shoulder, not elsewhere classified M25. 612;  Muscle weakness (generalized) M62. 81,Z96. 612;  Presence of left artificial shoulder joint.   Precautions/Allergies:   Celebrex [celecoxib] and Robaxin [methocarbamol]   TREATMENT PLAN:  Effective Dates: 2020 TO 3/28/2021 (90 days). Frequency/Duration: 2 times a week for 90 Day(s) MEDICAL/REFERRING DIAGNOSIS:  L shoulder   DATE OF ONSET: 12/10/20  REFERRING PHYSICIAN: Candido Cantu MD MD Orders: eval and treat delta protocol,  Hep,ROM, strengthening  Return MD Appointment: [id number]            Patient called to cancel scheduled appointment due to he was with his wife at her doctor appointment and they were running late.

## 2020-12-30 NOTE — PROGRESS NOTES
Helga Lynn  : 1945  [place] / Product Type: Medicare /  [address] at [facility]  [address] [facility], [address], [place] [address].  [phone]   [phone]       OUTPATIENT PHYSICAL THERAPY: Daily Treatment Note 2020  Visit Count: 1     ICD-10: Treatment Diagnosis: Stiffness of left shoulder, not elsewhere classified M25. 612,  Muscle weakness (generalized) M62. 81,Z96. 612 - Presence of left artificial shoulder joint. Precautions/Allergies:   Celebrex [celecoxib] and Robaxin [methocarbamol]   TREATMENT PLAN:  Effective Dates: 2020 TO 3/30/2021 (90 days). Frequency/Duration: 2 times a week for 90 Day(s) MEDICAL/REFERRING DIAGNOSIS:  L shoulder reverse TSR  DATE OF ONSET: 12/10/20  REFERRING PHYSICIAN: Charles Payne MD MD Orders: eval and treat delta protocol  Return MD Appointment: Martir 10       Pre-treatment Symptoms/Complaints:  Pt eager to try Game Ready vasopneumatic as his son is PT and has recommended it. He is having a great deal of difficulty sleeping. Pain: Initial:   4/10 Post Session:   Not rated but states it feels looser and better. Medications Last Reviewed:  20    Updated Objective Findings:   See evaluation note from today     TREATMENT:     MANUAL THERAPY: (15 minutes): Joint mobilization and Soft tissue mobilization was utilized and necessary because of the patient's restricted joint motion, painful spasm and restricted motion of soft tissue. Worked with pt on posture for chest expansion and thoracic extension and cervical retraction. Concurrent breathing ex with chest expansion ex. Manual assist for B shoulder retraction. Cervical retraction in supine and in sitting. Physiologic mobs to elbow for flex and ext. Pt performed active assistive shoulder elevation and elbow flex and ext. 15 min vasopneumatic device with cold for pain and swelling.     HEP: given HEP for postural ex.  Pt states he has hand wrist and elbow ROM and AA shoulder elevation already. [employer] Portal    Treatment/Session Summary:    · Response to Treatment:  Pt reponded well to postural ex. Shoulder and elbow are stiff and painful. .  · Communication/Consultation:  None today  · Equipment provided today:  None today  · Recommendations/Intent for next treatment session: Next visit will focus on continued mobility for L UE following Delta protocol.     Total Treatment Billable Duration:  15 min manual        Carlos Herring PT    Future Appointments   Date Time Provider Frank Branham   1/4/2021 11:00 AM RAMYA Mo   1/7/2021  2:30 PM RAMYA Mo   1/11/2021  1:00 PM RAMYA Mo   1/14/2021 11:15 AM Andi Chicas PT [facility]

## 2021-01-04 ENCOUNTER — HOSPITAL ENCOUNTER (OUTPATIENT)
Dept: PHYSICAL THERAPY | Age: 76
Discharge: HOME | End: 2021-01-04
Payer: MEDICARE

## 2021-01-04 NOTE — PROGRESS NOTES
Ridge Jiménez  : 1945  [place] / Product Type: Medicare /  [address]  at [address] [place] [address] Rd  [address] [facility], [address], [place] [address].  [phone]   [phone]       OUTPATIENT PHYSICAL THERAPY: Cancellation 2021      ICD-10: Treatment Diagnosis: Stiffness of left shoulder, not elsewhere classified M25. 612,  Muscle weakness (generalized) M62. 81,Z96. 612 - Presence of left artificial shoulder joint. Precautions/Allergies:   Celebrex [celecoxib] and Robaxin [methocarbamol]   TREATMENT PLAN:  Effective Dates: 2020 TO 3/30/2021 (90 days). Frequency/Duration: 2 times a week for 90 Day(s) MEDICAL/REFERRING DIAGNOSIS:  L shoulder reverse TSR  DATE OF ONSET: 12/10/20  REFERRING PHYSICIAN: Magui Nicole MD MD Orders: eval and treat delta protocol  Return MD Appointment: Martir 10       Patient called to cancel scheduled appointment due to his wife is not feeling well and he is taking her to get tested today.

## 2021-01-07 ENCOUNTER — APPOINTMENT (OUTPATIENT)
Dept: PHYSICAL THERAPY | Age: 76
End: 2021-01-07
Payer: MEDICARE

## 2021-01-11 ENCOUNTER — APPOINTMENT (OUTPATIENT)
Dept: PHYSICAL THERAPY | Age: 76
End: 2021-01-11
Payer: MEDICARE

## 2021-01-14 ENCOUNTER — HOSPITAL ENCOUNTER (OUTPATIENT)
Dept: PHYSICAL THERAPY | Age: 76
Discharge: HOME | End: 2021-01-14
Payer: MEDICARE

## 2021-01-14 ENCOUNTER — APPOINTMENT (OUTPATIENT)
Dept: PHYSICAL THERAPY | Age: 76
End: 2021-01-14
Payer: MEDICARE

## 2021-01-14 PROCEDURE — 97140 MANUAL THERAPY 1/> REGIONS: CPT

## 2021-01-14 PROCEDURE — 97110 THERAPEUTIC EXERCISES: CPT

## 2021-01-14 NOTE — PROGRESS NOTES
Kristel Rodriguez  : 1945  [place] / Product Type: Medicare /  [address] at [facility]  1101 [facility], [address], [address] [place] [address]  [phone]   [phone]       OUTPATIENT PHYSICAL THERAPY: Daily Treatment Note 2021  Visit Count: 2  ICD-10: Treatment Diagnosis: Stiffness of left shoulder, not elsewhere classified M25. 612;  Muscle weakness (generalized) M62. 81,Z96. 612 - Presence of left artificial shoulder joint.     Precautions/Allergies:   Celebrex [celecoxib] and Robaxin [methocarbamol]   TREATMENT PLAN:  Effective Dates: 2020 TO 3/28/2021 (90 days). Frequency/Duration: 2 times a week for 90 Day(s) MEDICAL/REFERRING DIAGNOSIS:  L shoulder   DATE OF ONSET: 12/10/20  REFERRING PHYSICIAN: Jennifer Foote MD MD Orders: eval and treat delta protocol,  Hep,ROM, strengthening  Return MD Appointment: 21            Pre-treatment Symptoms/Complaints:  Patient reports he had COVID but continued to do his motion exercises as well as he could. His MD appointment was moved out to next week. His shoulder seems to be doing okay. Pain: Initial: Pain Intensity 1: 2(\"1 or 2\")   Post Session:  \"More than when I came in, but not too bad\"   Medications Last Reviewed:  21 Took a Tramadol last night, but otherwise taking nothing for his shoulder. Updated Objective Findings:   None Today     TREATMENT:     Manual Therapy ( 30 minutes) - for motion - grade 2 to 4- physio mobs L shoulder flex, abduct, IR and ER at multiple angles. Grade 2 to 4- inferior and posterior shoulder glides. Therapeutic Exercise: (15 Minutes):  Exercises per grid below to improve mobility and strength. Required moderate verbal and tactile cues to ensure correct performance. Progressed complexity of movement as indicated.   Manually resisted submaximal shoulder isometric exercises for flexion, abduction and extn 1 x 10 each for 3 to 5 seconds. For IR and ER 2 x 10 each for 3 to 5 seconds. Pulleys for flexion, scaption and abduction x 3 to 5 each. HEP: continue current HEP for motion  [employer] Portal    Treatment/Session Summary:    · Response to Treatment:  Slight increase in pain noted after starting isometrics and doing pulleys. Motion was better than expected considering patient had not been to PT except for initial evaluation. · Communication/Consultation:  None today  · Equipment provided today:  pulleys issued for HEP use. · Recommendations/Intent for next treatment session: Next visit will focus on shoulder ROM and shoulder isometrics.      Total Treatment Billable Duration:  45 minutes  PT Patient Time In/Time Out  Time In: 1115  Time Out: 1205    Bridgett Rodrigues, PT    Future Appointments   Date Time Provider Frank Branham   1/19/2021 11:15 AM Felix Cast, PT [facility]   1/21/2021  9:45 AM Felix Cast, PT [facility]   1/26/2021  9:45 AM Felix Cast, PT [facility]   1/28/2021  9:45 AM Cristin Jain, PT [facility]

## 2021-01-19 ENCOUNTER — HOSPITAL ENCOUNTER (OUTPATIENT)
Dept: PHYSICAL THERAPY | Age: 76
Discharge: HOME | End: 2021-01-19
Payer: MEDICARE

## 2021-01-19 PROCEDURE — 97140 MANUAL THERAPY 1/> REGIONS: CPT

## 2021-01-19 PROCEDURE — 97110 THERAPEUTIC EXERCISES: CPT

## 2021-01-19 NOTE — PROGRESS NOTES
John Paul Goodrich  : 1945  [place] / Product Type: Medicare /  [address] at [facility]  [address] [facility], [address], [place] [address].  [phone]   [phone]       OUTPATIENT PHYSICAL THERAPY: Daily Treatment Note 2021  Visit Count: 3  ICD-10: Treatment Diagnosis: Stiffness of left shoulder, not elsewhere classified M25. 612;  Muscle weakness (generalized) M62. 81,Z96. 612 - Presence of left artificial shoulder joint.     Precautions/Allergies:   Celebrex [celecoxib] and Robaxin [methocarbamol]   TREATMENT PLAN:  Effective Dates: 2020 TO 3/28/2021 (90 days). Frequency/Duration: 2 times a week for 90 Day(s) MEDICAL/REFERRING DIAGNOSIS:  L shoulder   DATE OF ONSET: 12/10/20  REFERRING PHYSICIAN: Shanika Armenta MD MD Orders: eval and treat HEP,ROM, strengthening, full motion/full strength (writtten 21)  Return MD Appointment: 21            Pre-treatment Symptoms/Complaints:  Patient reports he saw MD and he seemed pleased with his overall motion. He can start strengthening now. He likes using the pulleys at home. Pain: Initial: Pain Intensity 1: 1   Post Session:  \"Not much different that before\"   Medications Last Reviewed:  21 No longer taking Tramadol    Updated Objective Findings:   None Today     TREATMENT:     Manual Therapy ( 15 minutes) - for motion - grade 2 to 4- physio mobs L shoulder flex, abduct, IR and ER at multiple angles. Grade 2 to 4- inferior and posterior shoulder glides. Therapeutic Exercise: (25 Minutes):  Exercises per grid below to improve mobility and strength. Required moderate verbal and tactile cues to ensure correct performance. Progressed complexity of movement as indicated. AIS 3 sec x 10 each for L shoulder flex, abduct, ER at 45 and 90 degree abduct, IR at 90 degree abduct and at 90 degree flex, horizontal adduct, D1 and D2  Diagonals - all in supine. Date:  1/19/21 Date:   Date:     Activity/Exercise Parameters Parameters Parameters   5 way shoulder isometrics Manual resist  2 x 10 ea     B serratus punch 2x10     B shldr flex in supine 2x10     Side lying mid trap with liftoff 2x10     Side lying low trap with liftoff 2x10                       HEP: continue current HEP for motion. He is to rest the shoulder tomorrow for strengthening to resume on Thursday. [employer] Portal    Treatment/Session Summary:    · Response to Treatment:  Patient did well with start of strengthening today. · Communication/Consultation:  None today  · Equipment provided today:  None today  · Recommendations/Intent for next treatment session: Next visit will focus on shoulder ROM and strengthening.      Total Treatment Billable Duration:  40 minutes  PT Patient Time In/Time Out  Time In: 2094  Time Out: [address] . Munira Bowers PT    Future Appointments   Date Time Provider Frank Branham   1/21/2021  9:45 AM Dennie Farrier, PT [facility]   1/26/2021  9:45 AM [facility]   1/28/2021  9:45 AM Nicky La PT [facility]

## 2021-01-21 ENCOUNTER — HOSPITAL ENCOUNTER (OUTPATIENT)
Dept: PHYSICAL THERAPY | Age: 76
Discharge: HOME | End: 2021-01-21
Payer: MEDICARE

## 2021-01-21 PROCEDURE — 97140 MANUAL THERAPY 1/> REGIONS: CPT

## 2021-01-21 PROCEDURE — 97110 THERAPEUTIC EXERCISES: CPT

## 2021-01-21 NOTE — PROGRESS NOTES
Mango Cespedes  : 1945  [place] / Product Type: Medicare /  [address] at [facility] [place]  [address] [facility], [address], [place] [address].  [phone]   [phone]       OUTPATIENT PHYSICAL THERAPY: Daily Treatment Note 2021  Visit Count: 4  ICD-10: Treatment Diagnosis: Stiffness of left shoulder, not elsewhere classified M25. 612;  Muscle weakness (generalized) M62. 81,Z96. 612 - Presence of left artificial shoulder joint.     Precautions/Allergies:   Celebrex [celecoxib] and Robaxin [methocarbamol]   TREATMENT PLAN:  Effective Dates: 2020 TO 3/28/2021 (90 days). Frequency/Duration: 2 times a week for 90 Day(s) MEDICAL/REFERRING DIAGNOSIS:  L shoulder   DATE OF ONSET: 12/10/20  REFERRING PHYSICIAN: Marianne Antunez MD MD Orders: eval and treat HEP,ROM, strengthening, full motion/full strength (writtten 21)  Return MD Appointment: 21            Pre-treatment Symptoms/Complaints:  Patient reports he had a rough night and is more sore today. Felt like shoulder kept tightening up whenever he wasn't using it. Pain: Initial: Pain Intensity 1: 3   Post Session: \"No increased pain   Medications Last Reviewed:  21 No longer taking Tramadol    Updated Objective Findings:   None Today     TREATMENT:     Manual Therapy ( 25 minutes) - for motion - grade 2 to 4- physio mobs L shoulder flex, abduct, IR and ER at multiple angles. Grade 2 to 4- inferior and posterior shoulder glides. Therapeutic Exercise: (15 Minutes):  Exercises per grid below to improve mobility and strength. Required moderate verbal and tactile cues to ensure correct performance. Progressed complexity of movement as indicated. AIS 3 sec x 10 each for L shoulder flex, abduct, ER at 45 and 90 degree abduct, IR at 90 degree abduct and at 90 degree flex, horizontal adduct, D1 and D2  Diagonals - all in supine.       Date:  21 Date:  21 Date:     Activity/Exercise Parameters Parameters Parameters   5 way shoulder isometrics Manual resist  2 x 10 ea Manual resist  1x10 ea    B serratus punch 2x10 -    B shldr flex in supine 2x10 -    Side lying mid trap with liftoff 2x10 -    Side lying low trap with liftoff 2x10 -                      HEP: continue current HEP for motion. May add wall isometrics. [employer] Portal    Treatment/Session Summary:    · Response to Treatment:  Patient was more sore today so we focused more on loosening shoulder up and did not progress the exercises. He is to continue with motion work at home and may add wall isometrics to HEP. · Communication/Consultation:  None today  · Equipment provided today:  None today  · Recommendations/Intent for next treatment session: Next visit will focus on shoulder ROM and strengthening.      Total Treatment Billable Duration:  40 minutes  PT Patient Time In/Time Out  Time In: 3626  Time Out: 2213    Johanne Calvert PT    Future Appointments   Date Time Provider Frank Branham   1/26/2021  9:45 AM Lucy Pope, [facility]   1/28/2021  9:45 AM [facility]uhair Green[facility]

## 2021-01-26 ENCOUNTER — HOSPITAL ENCOUNTER (OUTPATIENT)
Dept: PHYSICAL THERAPY | Age: 76
Discharge: HOME | End: 2021-01-26
Payer: MEDICARE

## 2021-01-26 PROCEDURE — 97110 THERAPEUTIC EXERCISES: CPT

## 2021-01-26 PROCEDURE — 97140 MANUAL THERAPY 1/> REGIONS: CPT

## 2021-01-26 NOTE — PROGRESS NOTES
Martin Brown  : 1945  [place] / Product Type: Medicare /  [address] at [facility]  [address] [facility], [address], [address] [facility]  [phone]   [phone]       OUTPATIENT PHYSICAL THERAPY: Daily Treatment Note 2021  Visit Count: 5  ICD-10: Treatment Diagnosis: Stiffness of left shoulder, not elsewhere classified M25. 612;  Muscle weakness (generalized) M62. 81,Z96. 612 - Presence of left artificial shoulder joint.     Precautions/Allergies:   Celebrex [celecoxib] and Robaxin [methocarbamol]   TREATMENT PLAN:  Effective Dates: 2020 TO 3/28/2021 (90 days). Frequency/Duration: 2 times a week for 90 Day(s) MEDICAL/REFERRING DIAGNOSIS:  L shoulder   DATE OF ONSET: 12/10/20  REFERRING PHYSICIAN: Andreas Oneill MD MD Orders: eval and treat HEP,ROM, strengthening, full motion/full strength (writtten 21)  Return MD Appointment: 21            Pre-treatment Symptoms/Complaints:  Patient reports he is doing okay. No new problems. Patient reports that he is not able to lie on his stomach. Pain: Initial: Pain Intensity 1: 2(\"1 or 2\")   Post Session:  \"Maybe a one or less\"   Medications Last Reviewed:  21      Updated Objective Findings:   None Today     TREATMENT:     Manual Therapy ( 15 minutes) - for motion - grade 2 to 4- physio mobs L shoulder flex, abduct, IR and ER at multiple angles. Grade 2 to 4- inferior and posterior shoulder glides. Therapeutic Exercise: (30 Minutes):  Exercises per grid below to improve mobility and strength. Required moderate verbal and tactile cues to ensure correct performance. Progressed complexity of movement as indicated. AIS 3 sec x 10 each for L shoulder flex, abduct, ER at 45 and 90 degree abduct, IR at 90 degree abduct and at 90 degree flex, horizontal adduct, D1 and D2  Diagonals - all in supine.       Date:  21 Date:  21 Date:  21 Activity/Exercise Parameters Parameters Parameters   5 way shoulder isometrics Manual resist  2 x 10 ea Manual resist  1x10 ea    B serratus punch 2x10 - 2x10   B shldr flex in supine 2x10 - -   Side lying mid trap with liftoff 2x10 - -   Side lying low trap with liftoff 2x10 - -   Side lying IR   2x10   Side lying abduct   2x10   Side lying ER   2x10   Bent over rows   2x10   Bent over shldr extn   2x10   B bicep curls   1# 2x10   Wall push ups   1x10                     HEP: continue current HEP for motion, he is not to do new strengthening exercises at home yet. Written copy of new exercises given to patient for future use. [employer]    Treatment/Session Summary:    · Response to Treatment:  Patient was able to begin exercises for strength today. Had difficulty with some of them, but overall pain level did not increase afterward. · Communication/Consultation:  None today  · Equipment provided today:  None today  · Recommendations/Intent for next treatment session: Next visit will focus on shoulder ROM and strengthening.      Total Treatment Billable Duration:  45 minutes  PT Patient Time In/Time Out  Time In: 3737  Time Out: 4689    Salvador Whatley PT    Future Appointments   Date Time Provider Frank Branham   1/28/2021  9:45 AM Baron Spivey PT [facility]

## 2021-01-28 ENCOUNTER — HOSPITAL ENCOUNTER (OUTPATIENT)
Dept: PHYSICAL THERAPY | Age: 76
Discharge: HOME | End: 2021-01-28
Payer: MEDICARE

## 2021-01-28 PROCEDURE — 97110 THERAPEUTIC EXERCISES: CPT

## 2021-01-28 PROCEDURE — 97140 MANUAL THERAPY 1/> REGIONS: CPT

## 2021-01-28 NOTE — PROGRESS NOTES
Suad cMdonald  : 1945  [place] / Product Type: Medicare /  [address]  at [facility] [place]  [address] [facility], [address], [place] [address].  [phone]   [phone]       OUTPATIENT PHYSICAL THERAPY: Daily Treatment Note 2021  Visit Count: 6  ICD-10: Treatment Diagnosis: Stiffness of left shoulder, not elsewhere classified M25. 612;  Muscle weakness (generalized) M62. 81,Z96. 612 - Presence of left artificial shoulder joint.     Precautions/Allergies:   Celebrex [celecoxib] and Robaxin [methocarbamol]   TREATMENT PLAN:  Effective Dates: 2020 TO 3/28/2021 (90 days). Frequency/Duration: 2 times a week for 90 Day(s) MEDICAL/REFERRING DIAGNOSIS:  L shoulder   DATE OF ONSET: 12/10/20  REFERRING PHYSICIAN: Marquita Yun MD MD Orders: eval and treat HEP,ROM, strengthening, full motion/full strength (writtten 21)  Return MD Appointment: 21            Pre-treatment Symptoms/Complaints:  Patient reports he was a little sore, maybe from sleeping on the shoulder, but he really isn't sure why. Pain: Initial: Pain Intensity 1: 1   Post Session: \"No worse\"    Medications Last Reviewed:  21      Updated Objective Findings:   None Today     TREATMENT:     Manual Therapy ( 15 minutes) - for motion - grade 2 to 4- physio mobs L shoulder flex, abduct, IR and ER at multiple angles. Grade 2 to 4- inferior and posterior shoulder glides. Therapeutic Exercise: (25 Minutes):  Exercises per grid below to improve mobility and strength. Required moderate verbal and tactile cues to ensure correct performance. Progressed resistance and complexity of movement as indicated. AIS 3 sec x 10 each for L shoulder flex, abduct, ER at 45 and 90 degree abduct, IR at 90 degree abduct and at 90 degree flex, horizontal adduct, D1 and D2  Diagonals - all in supine.       Date:  21 Date:  21 Date:  21 Date  21 Activity/Exercise Parameters Parameters Parameters    5 way shoulder isometrics Manual resist  2 x 10 ea Manual resist  1x10 ea  -   B serratus punch 2x10 - 2x10 1# 2x10   B shldr flex in supine 2x10 - - -   Side lying mid trap with liftoff 2x10 - - -   Side lying low trap with liftoff 2x10 - - -   Side lying IR   2x10 1# 2x10   Side lying abduct   2x10 1# 2x10   Side lying ER   2x10 1# 2x10   Bent over rows   2x10 1x10   Bent over shldr extn   2x10 1x10   B bicep curls   1# 2x10 1# 2x10   Wall push ups   1x10 2x10   IR with band    Red 2x10   ER with band    Red 2x10   Bent over mid trap    1x10   Bent over low trap    1x10         HEP: he is to rest on Friday and do strengthening on Saturday  [employer] Portal    Treatment/Session Summary:    · Response to Treatment:  Patient had difficulty with some exercises, but no lasting pain afterward. Gradually progressing. · Communication/Consultation:  None today  · Equipment provided today:  None today  · Recommendations/Intent for next treatment session: Next visit will focus on shoulder ROM and strengthening.      Total Treatment Billable Duration:  40 minutes  PT Patient Time In/Time Out  Time In: 6687  Time Out: 7[address] [place] [address] Twylla Nissen, PT    Future Appointments   Date Time Provider Frank Branham   2/2/2021  2:00 PM Erendira Ellison [facility]   2/4/2021  4:00 PM Erick Martins PT [facility]   2/9/2021  1:15 PM RAMYA Ellison [facility]   2/11/2021  [facility]

## 2021-02-02 ENCOUNTER — HOSPITAL ENCOUNTER (OUTPATIENT)
Dept: PHYSICAL THERAPY | Age: 76
Discharge: HOME | End: 2021-02-02
Payer: MEDICARE

## 2021-02-02 PROCEDURE — 97110 THERAPEUTIC EXERCISES: CPT

## 2021-02-02 PROCEDURE — 97140 MANUAL THERAPY 1/> REGIONS: CPT

## 2021-02-02 NOTE — PROGRESS NOTES
Jaret Thierry  : 1945  [place] / Product Type: Medicare /  [address]  at [facility] [place]  [address] [facility], [address], [place] [address].  [phone]   [phone]       OUTPATIENT PHYSICAL THERAPY: Daily Treatment Note 2021  Visit Count: 7  ICD-10: Treatment Diagnosis: Stiffness of left shoulder, not elsewhere classified M25. 612;  Muscle weakness (generalized) M62. 81,Z96. 612 - Presence of left artificial shoulder joint.     Precautions/Allergies:   Celebrex [celecoxib] and Robaxin [methocarbamol]   TREATMENT PLAN:  Effective Dates: 2020 TO 3/28/2021 (90 days). Frequency/Duration: 2 times a week for 90 Day(s) MEDICAL/REFERRING DIAGNOSIS:  L shoulder   DATE OF ONSET: 12/10/20  REFERRING PHYSICIAN: Kathryn Hazel MD MD Orders: eval and treat HEP,ROM, strengthening, full motion/full strength (writtten 21)  Return MD Appointment: 21            Pre-treatment Symptoms/Complaints:  Patient reports his shoulder is doing okay. No new problems. Pain: Initial: Pain Intensity 1: 1   Post Session:  Not rated   Medications Last Reviewed:  21      Updated Objective Findings:   ROM:       LUE AROM  L Shoulder Flexion: 125  L Shoulder Extension: 40  L Shoulder ABduction: 110  L Shoulder Internal Rotation: (lateral L buttock)  L Shoulder External Rotation: 8  LUE PROM  L Shoulder Flexion: 140  L Shoulder ABduction: 122  L Shoulder Internal Rotation: 65(at 80 degree abduct)  L Shoulder External Rotation: 35(at 80 degree abduct)                                 TREATMENT:     Manual Therapy ( 15 minutes) - for motion - grade 2 to 4- physio mobs L shoulder flex, abduct, IR and ER at multiple angles. Grade 2 to 4- inferior and posterior shoulder glides. Therapeutic Exercise: (30 Minutes):  Exercises per grid below to improve mobility and strength. Required moderate verbal and tactile cues to ensure correct performance. Progressed repetitions and complexity of movement as indicated. AIS 3 sec x 10 each for L shoulder flex, abduct, ER at 45 and 90 degree abduct, IR at 90 degree abduct and at 90 degree flex, horizontal adduct, D1 and D2  Diagonals - all in supine. Date:  1/21/21 Date:  1/26/21 Date  1/28/21 Date  2/2/21   Activity/Exercise Parameters Parameters     5 way shoulder isometrics Manual resist  1x10 ea  - -   B serratus punch - 2x10 1# 2x10 -   Side lying IR  2x10 1# 2x10 -   Side lying abduct  2x10 1# 2x10 -   Side lying ER  2x10 1# 2x10 -   Bent over rows  2x10 1x10 1# 2x15   Bent over shldr extn  2x10 1x10 1# 2x15   B bicep curls  1# 2x10 1# 2x10 1# 2x15   Wall push ups  1x10 2x10 2x10   IR with band   Red 2x10 Red 2x15   ER with band   Red 2x10 Red 2x15   Bent over mid trap   1x10 1x15   Bent over low trap   1x10 1x15   B Lat pull downs    3# 2x15         HEP: continue current HEP  [employer] Portal    Treatment/Session Summary:    · Response to Treatment:  Patient with good increase in ROM since initial evaluation. Progressing well. · Communication/Consultation:  None today  · Equipment provided today:  None today  · Recommendations/Intent for next treatment session: Next visit will focus on shoulder ROM and strengthening.      Total Treatment Billable Duration:  45 minutes  PT Patient Time In/Time Out  Time In: 2239  Time Out: 7940 EMirna Swenson [facility] Liza Garcia, PT    Future Appointments   Date Time Provider Frank Branham   2/4/2021  4:00 PM Rubina Meeks [facility]   2/9/2021  1:15 PM Rubina Meeks, RAMYA HENAO [facility]   2/11/2021  2:30 PM RAMYA Benitez [facility]

## 2021-02-04 ENCOUNTER — HOSPITAL ENCOUNTER (OUTPATIENT)
Dept: PHYSICAL THERAPY | Age: 76
Discharge: HOME | End: 2021-02-04
Payer: MEDICARE

## 2021-02-04 PROCEDURE — 97110 THERAPEUTIC EXERCISES: CPT

## 2021-02-04 PROCEDURE — 97140 MANUAL THERAPY 1/> REGIONS: CPT

## 2021-02-04 NOTE — PROGRESS NOTES
Helga Lynn  : 1945  [place] / Product Type: Medicare /  [address] at [facility] [place]  [address] [place] [address], [address], [place] [address].  [phone]   [phone]       OUTPATIENT PHYSICAL THERAPY: Daily Treatment Note 2021  Visit Count: 8  ICD-10: Treatment Diagnosis: Stiffness of left shoulder, not elsewhere classified M25. 612;  Muscle weakness (generalized) M62. 81,Z96. 612 - Presence of left artificial shoulder joint.     Precautions/Allergies:   Celebrex [celecoxib] and Robaxin [methocarbamol]   TREATMENT PLAN:  Effective Dates: 2020 TO 3/28/2021 (90 days). Frequency/Duration: 2 times a week for 90 Day(s) MEDICAL/REFERRING DIAGNOSIS:  L shoulder   DATE OF ONSET: 12/10/20  REFERRING PHYSICIAN: Charles Payne MD MD Orders: eval and treat HEP,ROM, strengthening, full motion/full strength (writtten 21)  Return MD Appointment: 21            Pre-treatment Symptoms/Complaints:  Patient reports his shoulder is doing okay. No new problems. Pain: Initial: Pain Intensity 1: 1(\"maybe less\")   Post Session:  Not rated   Medications Last Reviewed:  21      Updated Objective Findings:   None today     TREATMENT:     Manual Therapy ( 15 minutes) - for motion - grade 2 to 4- physio mobs L shoulder flex, abduct, IR and ER at multiple angles. Grade 2 to 4- inferior and posterior shoulder glides. Therapeutic Exercise: (25 Minutes):  Exercises per grid below to improve mobility and strength. Required moderate verbal and tactile cues to ensure correct performance. Progressed repetitions and complexity of movement as indicated. AIS 3 sec x 10 each for L shoulder flex, abduct, ER at 45 and 90 degree abduct, IR at 90 degree abduct and at 90 degree flex, horizontal adduct, D1 and D2  Diagonals - all in supine.       Date:  21 Date:  21 Date  21 Date  21 Date  21   Activity/Exercise Parameters Parameters      5 way shoulder isometrics Manual resist  1x10 ea  - - -   B serratus punch - 2x10 1# 2x10 - 1# 2x15   Side lying IR  2x10 1# 2x10 - 1# 2x15   Side lying abduct  2x10 1# 2x10 - 1# 2x15   Side lying ER  2x10 1# 2x10 - 1# 2x15   Bent over rows  2x10 1x10 1# 2x15 -   Bent over shldr extn  2x10 1x10 1# 2x15 -   B bicep curls  1# 2x10 1# 2x10 1# 2x15 1# 2x15   Wall push ups  1x10 2x10 2x10 2x10   IR with band   Red 2x10 Red 2x15 Red 2x15   ER with band   Red 2x10 Red 2x15 Red 2x15   Bent over mid trap   1x10 1x15 -   Bent over low trap   1x10 1x15 -   B Lat pull downs    3# 2x15 3# 2x15         HEP: continue current HEP  [employer] Portal    Treatment/Session Summary:    · Response to Treatment:  Patient with difficulty in ER in both motion and in strength. Works hard at exercises. · Communication/Consultation:  None today  · Equipment provided today:  None today  · Recommendations/Intent for next treatment session: Next visit will focus on shoulder ROM and strengthening.      Total Treatment Billable Duration:  40 minutes  PT Patient Time In/Time Out  Time In: 3402  Time Out: 4038    Satish Faustin PT    Future Appointments   Date Time Provider Frank Branham   2/9/2021  1:15 PM Marisol Segovia, PT [facility]   2/11/2021  2:30 PM Adeola Corey, PT [facility]

## 2021-02-09 ENCOUNTER — HOSPITAL ENCOUNTER (OUTPATIENT)
Dept: PHYSICAL THERAPY | Age: 76
Discharge: HOME | End: 2021-02-09
Payer: MEDICARE

## 2021-02-09 PROCEDURE — 97110 THERAPEUTIC EXERCISES: CPT

## 2021-02-09 PROCEDURE — 97140 MANUAL THERAPY 1/> REGIONS: CPT

## 2021-02-09 NOTE — PROGRESS NOTES
Barbara Rodríguez  : 1945  [place] / Product Type: Medicare /  [address] at [facility]  [address] [place] [address], [address], [place] [address].  [phone]   [phone]       OUTPATIENT PHYSICAL THERAPY: Daily Treatment Note 2021  Visit Count: 9  ICD-10: Treatment Diagnosis: Stiffness of left shoulder, not elsewhere classified M25. 612;  Muscle weakness (generalized) M62. 81,Z96. 612 - Presence of left artificial shoulder joint.     Precautions/Allergies:   Celebrex [celecoxib] and Robaxin [methocarbamol]   TREATMENT PLAN:  Effective Dates: 2020 TO 3/28/2021 (90 days). Frequency/Duration: 2 times a week for 90 Day(s) MEDICAL/REFERRING DIAGNOSIS:  L shoulder   DATE OF ONSET: 12/10/20  REFERRING PHYSICIAN: Karie Rutledge MD MD Orders: eval and treat HEP,ROM, strengthening, full motion/full strength (writtten 21)  Return MD Appointment: 21            Pre-treatment Symptoms/Complaints:  Patient reports his shoulder is doing okay. No new problems. He and his wife are going to [place] next week so after his PT appointment this Thursday, he will be gone for a week. Pain: Initial: Pain Intensity 1: 1(\"about a half\")   Post Session:  About the same   Medications Last Reviewed:  21      Updated Objective Findings:   None today     TREATMENT:     Manual Therapy ( 15 minutes) - for motion - grade 2 to 4- physio mobs L shoulder flex, abduct, IR and ER at multiple angles. Grade 2 to 4- inferior and posterior shoulder glides. Therapeutic Exercise: (25 Minutes):  Exercises per grid below to improve mobility and strength. Required moderate verbal and tactile cues to ensure correct performance. Progressed repetitions and complexity of movement as indicated.   AIS 3 sec x 10 each for L shoulder flex, abduct, ER at 45 and 90 degree abduct, IR at 90 degree abduct and at 90 degree flex, horizontal adduct, D1 and D2  Diagonals - all in supine. Date:  1/26/21 Date  1/28/21 Date  2/2/21 Date  2/4/21 Date  2/9/21   Activity/Exercise Parameters       B serratus punch 2x10 1# 2x10 - 1# 2x15 2# 2x15   Side lying IR 2x10 1# 2x10 - 1# 2x15 2# 2x15   Side lying abduct 2x10 1# 2x10 - 1# 2x15 2# 2x15   Side lying ER 2x10 1# 2x10 - 1# 2x15 2# 2x15   Bent over rows 2x10 1x10 1# 2x15 - 2# 2x15   Bent over shldr extn 2x10 1x10 1# 2x15 - 2# 2x15   B bicep curls 1# 2x10 1# 2x10 1# 2x15 1# 2x15 2# 2x15   Wall push ups 1x10 2x10 2x10 2x10 -   IR with band  Red 2x10 Red 2x15 Red 2x15 Red 2x15   ER with band  Red 2x10 Red 2x15 Red 2x15 Red 2x15   Bent over mid trap  1x10 1x15 - -   Bent over low trap  1x10 1x15 - -   B Lat pull downs   3# 2x15 3# 2x15 -   B shldr flex with wand     2x10         HEP: continue current HEP  [employer] Portal    Treatment/Session Summary:    · Response to Treatment:  Patient did okay with all exercises today. Handled increased resistance well. · Communication/Consultation:  None today  · Equipment provided today:  None today  · Recommendations/Intent for next treatment session: Next visit will focus on shoulder ROM and strengthening.      Total Treatment Billable Duration:  40 minutes  PT Patient Time In/Time Out  Time In: 1120  Time Out: [address] Kenya Booker PT    Future Appointments   Date Time Provider Frank Greeni   2/11/2021  2:30 PM Baron Patric PT [facility]   2/23/2021 11:15 AM RAMYA Osorio [facility]   2/25/2021  1:00 PM RAMYA Osorio [facility]   3/2/2021 11:15 AM Josep Matthews PT [facility]

## 2021-02-11 ENCOUNTER — HOSPITAL ENCOUNTER (OUTPATIENT)
Dept: PHYSICAL THERAPY | Age: 76
Discharge: HOME | End: 2021-02-11
Payer: MEDICARE

## 2021-02-11 PROCEDURE — 97110 THERAPEUTIC EXERCISES: CPT

## 2021-02-11 PROCEDURE — 97140 MANUAL THERAPY 1/> REGIONS: CPT

## 2021-02-11 NOTE — PROGRESS NOTES
Elke Hendricks  : 1945  [place]  Secondary: Morris Jacob at [facility] [place]  [address] [facility], [address] [place] [address],[address], [facility] [address]  [phone]   [phone]       OUTPATIENT PHYSICAL THERAPY:Progress Report 2021     ICD-10: Treatment Diagnosis: Stiffness of left shoulder, not elsewhere classified M25. 612,  Muscle weakness (generalized) M62. 81,Z96. 612 - Presence of left artificial shoulder joint. Precautions/Allergies:   Celebrex [celecoxib] and Robaxin [methocarbamol]   TREATMENT PLAN:  Effective Dates: 2020 TO 3/28/2021 (90 days). Frequency/Duration: 2 times a week for 90 Day(s)  Patient has attended 10 PT sessions from 20 to 21 with 2 cancelled sessions MEDICAL/REFERRING DIAGNOSIS:  L shoulder   DATE OF ONSET: 12/10/20  REFERRING PHYSICIAN: Ricky Crowe MD MD Orders: eval and treat HEP,ROM, strengthening, full motion/full strength (writtten 21)  Return MD Appointment: 21     ASSESSMENT:  Mr. Dee Smith presented to PT 18 days following reverse L shoulder replacement. Patient was having difficulty with positioning and had decreased ROM, strengthening and use of L UE following procedure. He has improved greatly in ROM and he is early in the strengthening phase of Rehab. DASH score is improved. He could benefit from continued PT to address deficits and work towards goals. Bertha Castelan PROBLEM LIST (Impacting functional limitations):  1. Decreased Strength  2. Increased Pain  3. Decreased Activity Tolerance  4. Decreased Flexibility/Joint Mobility  5. Decreased [place] with Home Exercise Program INTERVENTIONS PLANNED: (Treatment may consist of any combination of the following)  1. Cold  2. Home Exercise Program (HEP)  3. Manual Therapy  4. Range of Motion (ROM)  5.  Therapeutic Exercise/Strengthening     GOALS: (Goals have been discussed and agreed upon with patient.)  Short-Term Functional Goals: Time Frame: 3 weeks  1. Shoulder PROM 120 or greater, elbow ext to 0. - MET for shoulder flex (2/11/21)  2. Improved sleep with better positioning. - patient reports he still has trouble sleeping (2/11/21)  3. Independent with HEP - MET (2/11/21)  Discharge Goals: Time Frame: 8-12 weeks - all in progress  1. Elevation of UE to 120  2. Independent with HEP to maintain improvements and return to gym workout (He has returned to gym, mostly doing LE workouts. 2/11/21)  3. Improve DASH to 25 or better indicating improved function. OUTCOME MEASURE:   Tool Used: Disabilities of the Arm, Shoulder and Hand (DASH) Questionnaire - Quick Version  Score:  Initial: 39/55  Most Recent: 31/55 (Date: 2/11/21 )   Interpretation of Score: The DASH is designed to measure the activities of daily living in person's with upper extremity dysfunction or pain. Each section is scored on a 1-5 scale, 5 representing the greatest disability. The scores of each section are added together for a total score of 55. MEDICAL NECESSITY:   · Patient is expected to demonstrate progress in strength, range of motion, coordination and functional technique to return function of L UE. [place] Bound REASON FOR SERVICES/OTHER COMMENTS:  · Patient continues to require skilled intervention due to recent L reverse total shoulder replacement. .     Data from initial evaluation unless otherwise indicated. PAIN/SUBJECTIVE:   Initial: Pain Intensity 1: 2(\"between 1 and 2\")   Post Session:  \"3 after working out\"  (2/11/21)   HISTORY:   History of Injury/Illness (Reason for Referral):  Pt had L reverse total shoulder  Past Medical History/Comorbidities:   Mr. Anna Menon  has a past medical history of CAD (coronary artery disease), Chronic venous insufficiency, Diabetes ([address] GERD (gastroesophageal reflux disease), High cholesterol, Hypertension, Osteoarthritis, and Seasonal allergic rhinitis.   Mr. Anna Menon  has a past surgical history that includes hx hernia repair (Right, 2002); hx vasectomy (2004); hx knee arthroscopy (Right, 2009); hx cataract removal (Bilateral, 2013); hx heent (Left, 1975); hx rotator cuff repair (Right, 2015); hx hip replacement (Right, 01/2016); hx hip replacement (Left, 03/2017); and hx tonsillectomy. Social History/Living Environment:     lives with wife in home with some steps   Prior Level of Function/Work/Activity:  Works out at [employer] with [employer] daily until surgery     Current Medications:  From EMR      Current Outpatient Medications:     acetaminophen (TYLENOL) 500 mg tablet, Take 1,000 mg by mouth every six (6) hours as needed for Pain., Disp: , Rfl:     omega-3 fatty acids-fish oil (Fish Oil) 360-1,200 mg cap, Take 1 Cap by mouth daily. , Disp: , Rfl:     metFORMIN (GLUMETZA ER) 500 mg TG24 24 hour tablet, Take 1,500 mg by mouth daily. Indications: type 2 diabetes mellitus, Disp: , Rfl:     cyanocobalamin (VITAMIN B12) 100 mcg tablet, Take 125 mcg by mouth daily. , Disp: , Rfl:     montelukast (SINGULAIR) 10 mg tablet, Take 10 mg by mouth nightly. Indications: seasonal runny nose, Disp: , Rfl:     azelastine (ASTELIN) 137 mcg (0.1 %) nasal spray, 1 [place] by Both Nostrils route every twelve (12) hours. . , Disp: , Rfl:     multivitamin (ONE A DAY) tablet, Take 1 Tab by mouth daily. , Disp: , Rfl:     COQ10, UBIQUINOL, PO, Take 100 mg by mouth daily. , Disp: , Rfl:     ramipril (ALTACE) 5 mg capsule, Take 5 mg by mouth nightly. Indications: high blood pressure, Disp: , Rfl:     atorvastatin (LIPITOR) 20 mg tablet, Take 20 mg by mouth nightly. Indications: high cholesterol, Disp: , Rfl:     psyllium husk, with sugar, 3.4 gram pwpk, Take  by mouth daily.  1 tsp daily, Disp: , Rfl:     cholecalciferol (VITAMIN D3) 1,000 unit tablet, Take 5,000 Units by mouth nightly., Disp: , Rfl:       Date Last Reviewed:  2/11/21   EXAMINATION:   Observation/Orthostatic Postural Assessment:          Patient in no acute distress            ROM: 2/11/21     LUE AROM  L Shoulder Flexion: 120  L Shoulder Extension: 43  L Shoulder ABduction: 110  L Shoulder Internal Rotation: (lateral L buttock)  L Shoulder External Rotation: 10  LUE PROM  L Shoulder Flexion: 145  L Shoulder ABduction: 130  L Shoulder Internal Rotation: 60(at 80 degrees abduction)  L Shoulder External Rotation: 55(at 80 degrees abduction)                              Strength:     Not tested on 2/11/21

## 2021-02-11 NOTE — PROGRESS NOTES
Silvia Benites  : 1945  [place] / Product Type: Medicare /  [address] at [facility] CAS TAYLOR  [address] [place] [address], [address], [place] [address].  [phone]   [phone]       OUTPATIENT PHYSICAL THERAPY: Daily Treatment Note 2021  Visit Count: 10  ICD-10: Treatment Diagnosis: Stiffness of left shoulder, not elsewhere classified M25. 612;  Muscle weakness (generalized) M62. 81,Z96. 612 - Presence of left artificial shoulder joint.     Precautions/Allergies:   Celebrex [celecoxib] and Robaxin [methocarbamol]   TREATMENT PLAN:  Effective Dates: 2020 TO 3/28/2021 (90 days). Frequency/Duration: 2 times a week for 90 Day(s) MEDICAL/REFERRING DIAGNOSIS:  L shoulder   DATE OF ONSET: 12/10/20  REFERRING PHYSICIAN: Emmanuel Olguin MD MD Orders: eval and treat HEP,ROM, strengthening, full motion/full strength (writtten 21)  Return MD Appointment: 21            Pre-treatment Symptoms/Complaints:  Patient reports his shoulder is a little more sore today. He and his wife are going to [place] next week so after his PT today, he will be gone for a week. Pain: Initial: Pain Intensity 1: 2(\"between 1 and 2\")   Post Session:  \"Maybe a 3. It always goes up a little when I work it hard\"   Medications Last Reviewed:  21      Updated Objective Findings:   See Progress Report     TREATMENT:     Manual Therapy ( 15 minutes) - for motion - grade 2 to 4- physio mobs L shoulder flex, abduct, IR and ER at multiple angles. Grade 2 to 4- inferior and posterior shoulder glides. Therapeutic Exercise: (25 Minutes):  Exercises per grid below to improve mobility and strength. Required moderate verbal and tactile cues to ensure correct performance. Progressed repetitions and complexity of movement as indicated.   AIS 3 sec x 10 each for L shoulder flex, abduct, ER at 45 and 90 degree abduct, IR at 90 degree abduct and at 90 degree flex, horizontal adduct, D1 and D2  Diagonals - all in supine. Date:  1/26/21 Date  1/28/21 Date  2/2/21 Date  2/4/21 Date  2/9/21 Date  2/11/21   Activity/Exercise Parameters        B serratus punch 2x10 1# 2x10 - 1# 2x15 2# 2x15 2# 2x15   Side lying IR 2x10 1# 2x10 - 1# 2x15 2# 2x15 -   Side lying abduct 2x10 1# 2x10 - 1# 2x15 2# 2x15 2# 2x15   Side lying ER 2x10 1# 2x10 - 1# 2x15 2# 2x15 2# 2x15   Bent over rows 2x10 1x10 1# 2x15 - 2# 2x15 -   Bent over shldr extn 2x10 1x10 1# 2x15 - 2# 2x15 -   B bicep curls 1# 2x10 1# 2x10 1# 2x15 1# 2x15 2# 2x15 -   Wall push ups 1x10 2x10 2x10 2x10 - 2x10   IR with band  Red 2x10 Red 2x15 Red 2x15 Red 2x15 Red 2x15   ER with band  Red 2x10 Red 2x15 Red 2x15 Red 2x15 Red 2x15   Bent over mid trap  1x10 1x15 - - -   Bent over low trap  1x10 1x15 - - -   B Lat pull downs   3# 2x15 3# 2x15 - -   B shldr flex with wand     2x10 -   D1/D2 diagonals on wall      2 x 5 each         HEP: continue current HEP  [facility]    Treatment/Session Summary:    · Response to Treatment:  Patient improving overall. See Progress Report for full assessment. He will be out of town next week. · Communication/Consultation:  None today  · Equipment provided today:  None today  · Recommendations/Intent for next treatment session: Next visit will focus on shoulder ROM and strengthening.      Total Treatment Billable Duration:  40 minutes  PT Patient Time In/Time Out  Time In: 1400  Time Out: [address] Irma Merlos PT    Future Appointments   Date Time Provider Frank Branham   2/23/2021 11:15 AM Sergio Alfaro, PT [facility]   2/25/2021  1:00 PM Sergio Alfaro, PT [facility]   3/2/2021 11:15 AM Andi Chicas, RAMYA HENAO [facility]

## 2021-02-23 ENCOUNTER — HOSPITAL ENCOUNTER (OUTPATIENT)
Dept: PHYSICAL THERAPY | Age: 76
Discharge: HOME | End: 2021-02-23
Payer: MEDICARE

## 2021-02-23 PROCEDURE — 97110 THERAPEUTIC EXERCISES: CPT

## 2021-02-23 PROCEDURE — 97140 MANUAL THERAPY 1/> REGIONS: CPT

## 2021-02-23 NOTE — PROGRESS NOTES
Ger Bailey  : 1945  [place] / Product Type: Medicare /  [address]  at [facility]  [address] [facility], [address], [place] [address].  [phone]   [phone]       OUTPATIENT PHYSICAL THERAPY: Daily Treatment Note 2021  Visit Count: 11  ICD-10: Treatment Diagnosis: Stiffness of left shoulder, not elsewhere classified M25. 612;  Muscle weakness (generalized) M62. 81,Z96. 612 - Presence of left artificial shoulder joint.     Precautions/Allergies:   Celebrex [celecoxib] and Robaxin [methocarbamol]   TREATMENT PLAN:  Effective Dates: 2020 TO 3/28/2021 (90 days). Frequency/Duration: 2 times a week for 90 Day(s) MEDICAL/REFERRING DIAGNOSIS:  L shoulder   DATE OF ONSET: 12/10/20  REFERRING PHYSICIAN: Chad Lowry MD MD Orders: eval and treat HEP,ROM, strengthening, full motion/full strength (writtten 21)  Return MD Appointment: 21            Pre-treatment Symptoms/Complaints:  Patient reports he was in [place] last week. He returned to MD today and he was pleased with his motion, just wants him to get stronger. HE was sore for 2 days after last PT session - thinks it is from stretching to take measurements. Pain: Initial: Pain Intensity 1: 0   Post Session: \"No pain\"   Medications Last Reviewed:  21      Updated Objective Findings:   None today     TREATMENT:     Manual Therapy ( 15 minutes) - for motion - grade 2 to 4- physio mobs L shoulder flex, abduct, IR and ER at multiple angles. Grade 2 to 4- inferior and posterior shoulder glides. Therapeutic Exercise: (25 Minutes):  Exercises per grid below to improve mobility and strength. Required moderate verbal and tactile cues to ensure correct performance. Progressed complexity of movement as indicated.   AIS 3 sec x 10 each for L shoulder flex, abduct, ER at 45 and 90 degree abduct, IR at 90 degree abduct and at 90 degree flex, horizontal adduct, D1 and D2  Diagonals - all in supine. Date  2/2/21 Date  2/4/21 Date  2/9/21 Date  2/11/21 Date  2/23/21   Activity/Exercise        B serratus punch - 1# 2x15 2# 2x15 2# 2x15 2# 2x15   Side lying IR - 1# 2x15 2# 2x15 - -   Side lying abduct - 1# 2x15 2# 2x15 2# 2x15 2# 2x15   Side lying ER - 1# 2x15 2# 2x15 2# 2x15 2# 2x15   Bent over rows 1# 2x15 - 2# 2x15 - 2# 2x15   Bent over shldr extn 1# 2x15 - 2# 2x15 - 2# 2x15   B bicep curls 1# 2x15 1# 2x15 2# 2x15 - 2# 2x15   Wall push ups 2x10 2x10 - 2x10 -   IR with band Red 2x15 Red 2x15 Red 2x15 Red 2x15 -   ER with band Red 2x15 Red 2x15 Red 2x15 Red 2x15 -   Bent over mid trap 1x15 - - - -   Bent over low trap 1x15 - - - -   B Lat pull downs 3# 2x15 3# 2x15 - - 7# 2x15   B shldr flex with wand   2x10 - -   D1/D2 diagonals on wall    2 x 5 each -   B cable rows     7# 2x15   B cable press     3# 2x15   B shldr flex with symmetry     1 x 10         HEP: continue current HEP  [facility]    Treatment/Session Summary:    · Response to Treatment:  Patient was out of town last week. He did well with resumption of exercises with no increased pain reported. · Communication/Consultation:  None today  · Equipment provided today:  None today  · Recommendations/Intent for next treatment session: Next visit will focus on shoulder ROM and strengthening.      Total Treatment Billable Duration:  40 minutes  PT Patient Time In/Time Out  Time In: 9562  Time Out: 1159    Odessa Mendes PT    Future Appointments   Date Time Provider Frank Branham   2/25/2021  1:00 PM Erendira Corado [facility]   3/2/2021 11:15 AM Brinda Maher PT [facility]   4/6/2021  9:15 AM Ariel Kwon MD [facility]

## 2021-02-25 ENCOUNTER — HOSPITAL ENCOUNTER (OUTPATIENT)
Dept: PHYSICAL THERAPY | Age: 76
Discharge: HOME | End: 2021-02-25
Payer: MEDICARE

## 2021-02-25 PROCEDURE — 97140 MANUAL THERAPY 1/> REGIONS: CPT

## 2021-02-25 PROCEDURE — 97110 THERAPEUTIC EXERCISES: CPT

## 2021-02-25 NOTE — PROGRESS NOTES
Arlette Shepherd  : 1945  [place] / Product Type: Medicare /  [address]  at [facility]  [address] [facility], [address], [place] [address].  [phone]   [phone]       OUTPATIENT PHYSICAL THERAPY: Daily Treatment Note 2021  Visit Count: 12  ICD-10: Treatment Diagnosis: Stiffness of left shoulder, not elsewhere classified M25. 612;  Muscle weakness (generalized) M62. 81,Z96. 612 - Presence of left artificial shoulder joint.     Precautions/Allergies:   Celebrex [celecoxib] and Robaxin [methocarbamol]   TREATMENT PLAN:  Effective Dates: 2020 TO 3/28/2021 (90 days). Frequency/Duration: 2 times a week for 90 Day(s) MEDICAL/REFERRING DIAGNOSIS:  L shoulder   DATE OF ONSET: 12/10/20  REFERRING PHYSICIAN: Cata Parra MD MD Orders: eval and treat HEP,ROM, strengthening, full motion/full strength (writtten 21)  Return MD Appointment: 21            Pre-treatment Symptoms/Complaints:  Patient reports he was not sore after last session like he was after the one where he was measured. Pain: Initial: Pain Intensity 1: 0   Post Session:  No pain reported   Medications Last Reviewed:  21      Updated Objective Findings:   None today     TREATMENT:     Manual Therapy ( 15 minutes) - for motion - grade 2 to 4- physio mobs L shoulder flex, abduct, IR and ER at multiple angles. Grade 2 to 4- inferior and posterior shoulder glides. Therapeutic Exercise: (25 Minutes):  Exercises per grid below to improve mobility and strength. Required moderate verbal and tactile cues to ensure correct performance. Progressed complexity of movement as indicated. AIS 3 sec x 10 each for L shoulder flex, abduct, ER at 45 and 90 degree abduct, IR at 90 degree abduct and at 90 degree flex, horizontal adduct, D1 and D2  Diagonals - all in supine.       Date  21 Date  21 Date  21 Date  21 Date  21 Date  21 Activity/Exercise         B serratus punch - 1# 2x15 2# 2x15 2# 2x15 2# 2x15 3# 2x12   Side lying IR - 1# 2x15 2# 2x15 - - -   Side lying abduct - 1# 2x15 2# 2x15 2# 2x15 2# 2x15 3# 2x12   Side lying ER - 1# 2x15 2# 2x15 2# 2x15 2# 2x15 3# 2x12   Bent over rows 1# 2x15 - 2# 2x15 - 2# 2x15 3# 2x12   Bent over shldr extn 1# 2x15 - 2# 2x15 - 2# 2x15 3# 2x12   Bent over mid trap      2# 2x12   Bent over low trap      2# 2x12   B bicep curls 1# 2x15 1# 2x15 2# 2x15 - 2# 2x15 3# 2x15   Wall push ups 2x10 2x10 - 2x10 - -   IR with band Red 2x15 Red 2x15 Red 2x15 Red 2x15 - -   ER with band Red 2x15 Red 2x15 Red 2x15 Red 2x15 - -   Bent over mid trap 1x15 - - - - -   Bent over low trap 1x15 - - - - -   B Lat pull downs 3# 2x15 3# 2x15 - - 7# 2x15 7# 2x15   B shldr flex with wand   2x10 - - -   D1/D2 diagonals on wall    2 x 5 each - -   B cable rows     7# 2x15 7# 2x15   B cable press     3# 2x15 3# 2x15   B shldr flex with symmetry     1 x 10 -         HEP: continue current HEP  [facility]    Treatment/Session Summary:    · Response to Treatment:  Patient slowly progressing with strengthening. No increased pain noted with increased resistance. · Communication/Consultation:  None today  · Equipment provided today:  None today  · Recommendations/Intent for next treatment session: Next visit will focus on shoulder ROM and strengthening.      Total Treatment Billable Duration:  40 minutes  PT Patient Time In/Time Out  Time In: 1300  Time Out: 175 Lester Booker, PT    Future Appointments   Date Time Provider Frank Branham   3/2/2021 11:15 AM Baron Spivey, PT [facility]   3/5/2021 12:45 PM Candi Phalen, PT [facility]   3/9/2021  2:00 PM Hailey Berry, PT [facility]   3/11/2021 11:15 AM Baron Spivey, PT [facility]   3/16/2021 11:15 AM Baron Spivey, PT [facility]   3/18/2021 11:15 AM Josep Matthews, [facility]   4/6/2021  9:15 AM Jorge Kwon Jr., MD [facility]

## 2021-03-02 ENCOUNTER — HOSPITAL ENCOUNTER (OUTPATIENT)
Dept: PHYSICAL THERAPY | Age: 76
Discharge: HOME | End: 2021-03-02
Payer: MEDICARE

## 2021-03-02 PROCEDURE — 97140 MANUAL THERAPY 1/> REGIONS: CPT

## 2021-03-02 PROCEDURE — 97110 THERAPEUTIC EXERCISES: CPT

## 2021-03-02 NOTE — PROGRESS NOTES
Nawaf Villatoro  : 1945  [place] / Product Type: Medicare /  [address] at [facility]  [address] [facility], [address], [place] [address].  [phone]   [phone]       OUTPATIENT PHYSICAL THERAPY: Daily Treatment Note 3/2/2021  Visit Count: 13  ICD-10: Treatment Diagnosis: Stiffness of left shoulder, not elsewhere classified M25. 612;  Muscle weakness (generalized) M62. 81,Z96. 612 - Presence of left artificial shoulder joint.     Precautions/Allergies:   Celebrex [celecoxib] and Robaxin [methocarbamol]   TREATMENT PLAN:  Effective Dates: 2020 TO 3/28/2021 (90 days). Frequency/Duration: 2 times a week for 90 Day(s) MEDICAL/REFERRING DIAGNOSIS:  L shoulder   DATE OF ONSET: 12/10/20  REFERRING PHYSICIAN: Marti Ruff.MD FLORES Orders: eval and treat HEP,ROM, strengthening, full motion/full strength (writtten 21)  Return MD Appointment: 21            Pre-treatment Symptoms/Complaints:  Patient reports he is stiff today. He is still concerned that he cannot raise his arm straight up in front of him. Notes he has been using 5# weights for bicep curls at the gym. Pain: Initial: Pain Intensity 1: 1   Post Session:  No pain reported   Medications Last Reviewed: 3/2/21      Updated Objective Findings:   None today     TREATMENT:     Manual Therapy ( 15 minutes) - for motion - grade 2 to 4- physio mobs L shoulder flex, abduct, IR and ER at multiple angles. Grade 2 to 4- inferior and posterior shoulder glides. Therapeutic Exercise: (25 Minutes):  Exercises per grid below to improve mobility and strength. Required moderate verbal and tactile cues to ensure correct performance. Progressed resistance as indicated. AIS 3 sec x 10 each for L shoulder flex, abduct, ER at 45 and 90 degree abduct, IR at 90 degree abduct and at 90 degree flex, horizontal adduct, D1 and D2  Diagonals - all in supine.       Date  21 Date  2/11/21 Date  2/23/21 Date  2/25/21 Date  3/2/21   Activity/Exercise        B serratus punch 2# 2x15 2# 2x15 2# 2x15 3# 2x12 3# 2x15   Side lying IR 2# 2x15 - - - -   Side lying abduct 2# 2x15 2# 2x15 2# 2x15 3# 2x12 3# 2x15   Side lying ER 2# 2x15 2# 2x15 2# 2x15 3# 2x12 3# 2x15   Bent over rows 2# 2x15 - 2# 2x15 3# 2x12 5# 2x15   Bent over shldr extn 2# 2x15 - 2# 2x15 3# 2x12 5# 2x15   Bent over mid trap    2# 2x12 3# 2x15   Bent over low trap    2# 2x12 3# 2x15   B bicep curls 2# 2x15 - 2# 2x15 3# 2x15 5# 2x15   Wall push ups - 2x10 - - 1x10   IR with band Red 2x15 Red 2x15 - - Red 2x15   ER with band Red 2x15 Red 2x15 - - Red 2x15   B Lat pull downs - - 7# 2x15 7# 2x15 -   B shldr flex with wand 2x10 - - - -   D1/D2 diagonals on wall  2 x 5 each - - 1x10 ea   B cable rows   7# 2x15 7# 2x15 -   B cable press   3# 2x15 3# 2x15 -   B shldr flex with symmetry   1 x 10 - -         HEP: continue current HEP  [facility] Portal    Treatment/Session Summary:    · Response to Treatment:  Patient does all exercises as asked, but is concerned that he is not as far along as he should be for 12 weeks post op. · Communication/Consultation:  None today  · Equipment provided today:  None today  · Recommendations/Intent for next treatment session: Next visit will focus on shoulder ROM and strengthening.      Total Treatment Billable Duration:  40 minutes  PT Patient Time In/Time Out  Time In: 1120  Time Out: 1210    Odessa Mendes, PT    Future Appointments   Date Time Provider Frank Branham   3/5/2021 12:45 PM Paulina Baxter, PT [facility]   3/9/2021  2:00 PM Sina Albarado, PT [facility]   3/11/2021 11:15 AM Brinda Maher, [facility]   3/16/2021 11:15 AM Brinda Maher, PT [facility]   3/18/2021 11:15 AM Brinda Maher, PT [facility]   4/6/2021  9:15 AM Ariel Kwon MD [facility]

## 2021-03-05 ENCOUNTER — HOSPITAL ENCOUNTER (OUTPATIENT)
Dept: PHYSICAL THERAPY | Age: 76
Discharge: HOME | End: 2021-03-05
Payer: MEDICARE

## 2021-03-05 NOTE — PROGRESS NOTES
Awa May  : 1945  [place] / Product Type: Medicare /  [address] at [facility] AZIZAWHIT CLAUDIA  [address] [facility], [address] [place] [address],[address], [place] [address].  [phone]   [phone]         Patient called and cancelled his appointment for today. Will plan to see patient at next scheduled appointment.      Severino Chavez, PT

## 2021-03-09 ENCOUNTER — HOSPITAL ENCOUNTER (OUTPATIENT)
Dept: PHYSICAL THERAPY | Age: 76
Discharge: HOME | End: 2021-03-09
Payer: MEDICARE

## 2021-03-09 PROCEDURE — 97110 THERAPEUTIC EXERCISES: CPT

## 2021-03-09 NOTE — PROGRESS NOTES
Corin De La Fuente  : 1945  [place] / Product Type: Medicare /  [address] at [facility]  [address] [facility], [address], [place] [address].  [phone]   [phone]       OUTPATIENT PHYSICAL THERAPY: Daily Treatment Note 3/9/2021  Visit Count: 14  ICD-10: Treatment Diagnosis: Stiffness of left shoulder, not elsewhere classified M25. 612;  Muscle weakness (generalized) M62. 81,Z96. 612 - Presence of left artificial shoulder joint.     Precautions/Allergies:   Celebrex [celecoxib] and Robaxin [methocarbamol]   TREATMENT PLAN:  Effective Dates: 2020 TO 3/28/2021 (90 days). Frequency/Duration: 2 times a week for 90 Day(s) MEDICAL/REFERRING DIAGNOSIS:  L shoulder   DATE OF ONSET: 12/10/20  REFERRING PHYSICIAN: Dacia Brink MD MD Orders: eval and treat HEP,ROM, strengthening, full motion/full strength (writtten 21)  Return MD Appointment: 21            Pre-treatment Symptoms/Complaints:  Shoulder feels stiff upon arrival to PT. Has been going to the gym, and was attempting shrugs with 5 lbs dumbbell and felt some discomfort to L UE. Has difficulty with control of L UE with elevation. Pain: Initial:     Post Session:  No pain reported   Medications Last Reviewed: 3/2/21      Updated Objective Findings:   None today     TREATMENT:     Therapeutic Exercise: ( 45 minutes):  Exercises per grid below to improve mobility and strength. Required moderate verbal and tactile cues to ensure correct performance. Progressed resistance as indicated. Passive ranging to L shoulder with shoulder flexion, abduction, IR and ER with patient in supine. Supine L shoulder press with manually resisted ER 3 x 10, supine chest press with ball squeeze, 3 x 10 bilat, and green single band resisted supine press 3 x 12 L.       Date  21 Date  21 Date  21 Date  21 Date  3/2/21 Date  3/9/21   Activity/Exercise         B serratus punch 2# 2x15 2# 2x15 2# 2x15 3# 2x12 3# 2x15 Dynamic hug, blue, bilateral  3 x 10    Side lying IR 2# 2x15 - - - -    Side lying abduct 2# 2x15 2# 2x15 2# 2x15 3# 2x12 3# 2x15 Standing scaption  2# 3 x 10 L     Side lying ER 2# 2x15 2# 2x15 2# 2x15 3# 2x12 3# 2x15 0# 1 x 12  3# 1 x 6 (DC/d)  1# 2 x 10    Bent over rows 2# 2x15 - 2# 2x15 3# 2x12 5# 2x15    Bent over shldr extn 2# 2x15 - 2# 2x15 3# 2x12 5# 2x15    Bent over mid trap    2# 2x12 3# 2x15 Side lying  2 x 10 0# L    Bentover  0# 2 x 10 L   Bent over low trap    2# 2x12 3# 2x15 Side-lying  2 x 10 0# L   B bicep curls 2# 2x15 - 2# 2x15 3# 2x15 5# 2x15    Wall push ups - 2x10 - - 1x10    IR with band Red 2x15 Red 2x15 - - Red 2x15 Resisted flexion, yellow 3 x 10    ER with band Red 2x15 Red 2x15 - - Red 2x15    B Lat pull downs - - 7# 2x15 7# 2x15 -    B shldr flex with wand 2x10 - - - -    D1/D2 diagonals on wall  2 x 5 each - - 1x10 ea    B cable rows   7# 2x15 7# 2x15 - 7# 3 x 10 R   B cable press   3# 2x15 3# 2x15 -    B shldr flex with symmetry   1 x 10 - -        HEP: continue current HEP  [facility] Portal    Treatment/Session Summary:    · Response to Treatment:  Patient did well with strengthening, but did exhibit some difficulties with control of L UE with pressing movements. · Communication/Consultation:  None today  · Equipment provided today:  None today  · Recommendations/Intent for next treatment session: Next visit will focus on shoulder ROM and strengthening.      Total Treatment Billable Duration:  45 minutes  PT Patient Time In/Time Out  Time In: 1400  Time Out: [address], PT    Future Appointments   Date Time Provider Frank Greeni   3/9/2021  2:00 PM Ashlyn Pickens, PT [facility]   3/11/2021 11:15 AM Marisol Segovia, PT [facility]   3/16/2021 11:15 AM Marisolelsie Segovia, PT [facility]   3/18/2021 11:15 AM Marisol Segovia, PT [facility]   4/6/2021  9:15 AM Aleyda Walsh  Kary Potter MD [facility]

## 2021-03-11 ENCOUNTER — HOSPITAL ENCOUNTER (OUTPATIENT)
Dept: PHYSICAL THERAPY | Age: 76
Discharge: HOME | End: 2021-03-11
Payer: MEDICARE

## 2021-03-11 PROCEDURE — 97110 THERAPEUTIC EXERCISES: CPT

## 2021-03-11 PROCEDURE — 97140 MANUAL THERAPY 1/> REGIONS: CPT

## 2021-03-11 NOTE — PROGRESS NOTES
Luana Huggins  : 1945  [place] / Product Type: Medicare /  [address] at [facility] CAS TAYL[facility]  [address] [facility], [address], [place] [address].  [phone]   [phone]       OUTPATIENT PHYSICAL THERAPY: Daily Treatment Note 3/11/2021  Visit Count: 15  ICD-10: Treatment Diagnosis: Stiffness of left shoulder, not elsewhere classified M25. 612;  Muscle weakness (generalized) M62. 81,Z96. 612 - Presence of left artificial shoulder joint.     Precautions/Allergies:   Celebrex [celecoxib] and Robaxin [methocarbamol]   TREATMENT PLAN:  Effective Dates: 2020 TO 3/28/2021 (90 days). Frequency/Duration: 2 times a week for 90 Day(s) MEDICAL/REFERRING DIAGNOSIS:  L shoulder   DATE OF ONSET: 12/10/20  REFERRING PHYSICIAN: Candido Cantu MD MD Orders: eval and treat HEP,ROM, strengthening, full motion/full strength (writtten 21)  Return MD Appointment: 21            Pre-treatment Symptoms/Complaints:  Patient reports he has no pain, but shoulder continues to feel stiff. His wife had foot surgery last week so he has to work his appointments around hers. Pain: Initial: Pain Intensity 1: 0   Post Session:  None, just fatigue. Medications Last Reviewed: 3/11/21      Updated Objective Findings:   None today     TREATMENT:     Manual Therapy ( 15 minutes) - for motion - grade 2 to 4- physio mobs L shoulder flex, abduct, IR and ER at multiple angles. Grade 2 to 4- inferior and posterior shoulder glides. Therapeutic Exercise: (25 Minutes  ):  Exercises per grid below to improve mobility and strength. Required moderate verbal and tactile cues to ensure correct performance. Progressed resistance as indicated. AIS 3 sec x 10 each for L shoulder flex, abduct, ER at 45 and 90 degree abduct, IR at 90 degree abduct and at 90 degree flex, horizontal adduct, D1 and D2  Diagonals - all in supine.          Date  21 Date  21 Date  3/2/21 Date  3/9/21 Date  3/11/21   Activity/Exercise        B serratus punch 2# 2x15 3# 2x12 3# 2x15 Dynamic hug, blue, bilateral  3 x 10  3# 2x15   Side lying abduct 2# 2x15 3# 2x12 3# 2x15 Standing scaption  2# 3 x 10 L   3# 2x15   Side lying ER 2# 2x15 3# 2x12 3# 2x15 0# 1 x 12  3# 1 x 6 (DC/d)  1# 2 x 10  3# 2x15   Bent over rows 2# 2x15 3# 2x12 5# 2x15  -   Bent over shldr extn 2# 2x15 3# 2x12 5# 2x15  -   Bent over mid trap  2# 2x12 3# 2x15 Side lying  2 x 10 0# L    Bentover  0# 2 x 10 L -   Bent over low trap  2# 2x12 3# 2x15 Side-lying  2 x 10 0# L -   B bicep curls 2# 2x15 3# 2x15 5# 2x15  5# 2x15   Wall push ups - - 1x10  2x15   IR with band - - Red 2x15 Resisted flexion, yellow 3 x 10  Red 2x15   ER with band - - Red 2x15  Red 2x15   B Lat pull downs 7# 2x15 7# 2x15 -  10# 2x15   D1/D2 diagonals on wall - - 1x10 ea  -   B cable rows 7# 2x15 7# 2x15 - 7# 3 x 10 R 7# 2x15   B cable press 3# 2x15 3# 2x15 -  -   B shldr flex with symmetry 1 x 10 - -  -       HEP: continue current [facility]  [facility]    Treatment/Session Summary:    · Response to Treatment:  Patient works hard at exercises. Continues to progress slowly. · Communication/Consultation:  None today  · Equipment provided today:  None today  · Recommendations/Intent for next treatment session: Next visit will focus on shoulder ROM and strengthening.      Total Treatment Billable Duration:  40 minutes  PT Patient Time In/Time Out  Time In: 1115  Time Out: 1200    Garrison Mi PT    Future Appointments   Date Time Provider Frank Branham   3/16/2021 11:15 AM Tessie Parsons PT [facility]   3/18/2021 11:15 AM [facility] PT [facility]   4/6/2021  9:15 AM Mihir Kwon MD [facility]

## 2021-03-16 ENCOUNTER — HOSPITAL ENCOUNTER (OUTPATIENT)
Dept: PHYSICAL THERAPY | Age: 76
Discharge: HOME | End: 2021-03-16
Payer: MEDICARE

## 2021-03-16 PROCEDURE — 97110 THERAPEUTIC EXERCISES: CPT

## 2021-03-16 PROCEDURE — 97140 MANUAL THERAPY 1/> REGIONS: CPT

## 2021-03-16 NOTE — PROGRESS NOTES
Martin Brown  : 1945  [place] / Product Type: Medicare /  [address] at [facility] [place]  [address] [facility], [address], [place] [address].  [phone]   [phone]       OUTPATIENT PHYSICAL THERAPY: Daily Treatment Note 3/16/2021  Visit Count: 16  ICD-10: Treatment Diagnosis: Stiffness of left shoulder, not elsewhere classified M25. 612;  Muscle weakness (generalized) M62. 81,Z96. 612 - Presence of left artificial shoulder joint.     Precautions/Allergies:   Celebrex [celecoxib] and Robaxin [methocarbamol]   TREATMENT PLAN:  Effective Dates: 2020 TO 3/28/2021 (90 days). Frequency/Duration: 2 times a week for 90 Day(s) MEDICAL/REFERRING DIAGNOSIS:  L shoulder   DATE OF ONSET: 12/10/20  REFERRING PHYSICIAN: Andreas Oneill MD MD Orders: eval and treat HEP,ROM, strengthening, full motion/full strength (writtten 21)  Return MD Appointment: 21            Pre-treatment Symptoms/Complaints:  Patient reports he has no pain. No new problems. Pain: Initial: Pain Intensity 1: 0   Post Session:  None, just fatigue. Medications Last Reviewed: 3/10/21      Updated Objective Findings:   None today     TREATMENT:     Manual Therapy ( 15 minutes) - for motion - grade 2 to 4- physio mobs L shoulder flex, abduct, IR and ER at multiple angles. Grade 2 to 4- inferior and posterior shoulder glides. Therapeutic Exercise: (25 Minutes  ):  Exercises per grid below to improve mobility and strength. Required moderate verbal and tactile cues to ensure correct performance. Progressed resistance as indicated. AIS 3 sec x 10 each for L shoulder flex, abduct, ER at 45 and 90 degree abduct, IR at 90 degree abduct and at 90 degree flex, horizontal adduct, D1 and D2  Diagonals - all in supine.          Date  21 Date  21 Date  3/2/21 Date  3/9/21 Date  3/11/21 Date  3/16/21   Activity/Exercise         B serratus punch 2# 2x15 3# 2x12 3# 2x15 Dynamic hug, blue, bilateral  3 x 10  3# 2x15 4# 2x15   Side lying abduct 2# 2x15 3# 2x12 3# 2x15 Standing scaption  2# 3 x 10 L   3# 2x15 4# 2x15   Side lying ER 2# 2x15 3# 2x12 3# 2x15 0# 1 x 12  3# 1 x 6 (DC/d)  1# 2 x 10  3# 2x15 3# 2x15   Bent over rows 2# 2x15 3# 2x12 5# 2x15  - -   Bent over shldr extn 2# 2x15 3# 2x12 5# 2x15  - -   Bent over mid trap  2# 2x12 3# 2x15 Side lying  2 x 10 0# L    Bentover  0# 2 x 10 L - -   Bent over low trap  2# 2x12 3# 2x15 Side-lying  2 x 10 0# L - -   B bicep curls 2# 2x15 3# 2x15 5# 2x15  5# 2x15 5# 2x15   Wall push ups - - 1x10  2x15 -   IR with band - - Red 2x15 Resisted flexion, yellow 3 x 10  Red 2x15 Red 2x15   ER with band - - Red 2x15  Red 2x15 Red 2x15   B Lat pull downs 7# 2x15 7# 2x15 -  10# 2x15 10# 2x15   D1/D2 diagonals on wall - - 1x10 ea  - -   B cable rows 7# 2x15 7# 2x15 - 7# 3 x 10 R 7# 2x15 10# 2x15   B cable press 3# 2x15 3# 2x15 -  - 3# 2x15   B shldr flex with symmetry 1 x 10 - -  - 1# 2x10   B shldr abduct to 90 degrees      1# 2x10       HEP: continue current HEP  MedBridge Portal    Treatment/Session Summary:    · Response to Treatment:  Patient continues to work hard on strengthening. No significant changes to note. · Communication/Consultation:  None today  · Equipment provided today:  None today  · Recommendations/Intent for next treatment session: Next visit will focus on shoulder ROM and strengthening.      Total Treatment Billable Duration:  40 minutes  PT Patient Time In/Time Out  Time In: 0805  Time Out: 1203    Tiffani Sellers, PT    Future Appointments   Date Time Provider Frank Branham   3/18/2021 11:15 AM Nirmal Abraham PT [facility]   4/6/2021  9:15 AM Alina Kwon MD [facility]

## 2021-03-18 ENCOUNTER — HOSPITAL ENCOUNTER (OUTPATIENT)
Dept: PHYSICAL THERAPY | Age: 76
Discharge: HOME | End: 2021-03-18
Payer: MEDICARE

## 2021-03-18 PROBLEM — R29.898 WEAKNESS OF BOTH ARMS: Status: ACTIVE | Noted: 2021-03-18

## 2021-03-18 PROBLEM — R20.2 PARESTHESIA OF BOTH HANDS: Status: ACTIVE | Noted: 2021-03-18

## 2021-03-18 PROCEDURE — 97140 MANUAL THERAPY 1/> REGIONS: CPT

## 2021-03-18 PROCEDURE — 97110 THERAPEUTIC EXERCISES: CPT

## 2021-03-18 NOTE — PROGRESS NOTES
Tana Romo  : 1945  [place] / Product Type: Medicare /  [address] at [facility] [place]  [address] [facility], [address], [place] [address].  [phone]   [phone]       OUTPATIENT PHYSICAL THERAPY: Daily Treatment Note 3/18/2021  Visit Count: 17  ICD-10: Treatment Diagnosis: Stiffness of left shoulder, not elsewhere classified M25. 612;  Muscle weakness (generalized) M62. 81,Z96. 612 - Presence of left artificial shoulder joint.     Precautions/Allergies:   Celebrex [celecoxib] and Robaxin [methocarbamol]   TREATMENT PLAN:  Effective Dates: 2020 TO 3/28/2021 (90 days). Frequency/Duration: 2 times a week for 90 Day(s) MEDICAL/REFERRING DIAGNOSIS:  L shoulder   DATE OF ONSET: 12/10/20  REFERRING PHYSICIAN: Baldev Ramirez MD MD Orders: eval and treat HEP,ROM, strengthening, full motion/full strength (writtten 21)  Return MD Appointment: 21            Pre-treatment Symptoms/Complaints:  Patient reports he had an EMG done in his arms today and has B carpal tunnel. He had to keep L arm in a supinated position and that made him more sore than usual.    Pain: Initial: Pain Intensity 1: 0   Post Session:  \"More sore than usual\"    Medications Last Reviewed: 3/18/21      Updated Objective Findings:   None today     TREATMENT:     Manual Therapy ( 15 minutes) - for motion - grade 2 to 4- physio mobs L shoulder flex, abduct, IR and ER at multiple angles. Grade 2 to 4- inferior and posterior shoulder glides. Therapeutic Exercise: (25 Minutes  ):  Exercises per grid below to improve mobility and strength. Required moderate verbal and tactile cues to ensure correct performance. AIS 3 sec x 10 each for L shoulder flex, abduct, ER at 45 and 90 degree abduct, IR at 90 degree abduct and at 90 degree flex, horizontal adduct, D1 and D2  Diagonals - all in supine.          Date  21 Date  21 Date  3/2/21 Date  3/9/21 Date  3/11/21 Date  3/16/21 Date  3/18/21   Activity/Exercise          B serratus punch 2# 2x15 3# 2x12 3# 2x15 Dynamic hug, blue, bilateral  3 x 10  3# 2x15 4# 2x15 4# 2x15   Side lying abduct 2# 2x15 3# 2x12 3# 2x15 Standing scaption  2# 3 x 10 L   3# 2x15 4# 2x15 4# 1x15  3# 1x15   Side lying ER 2# 2x15 3# 2x12 3# 2x15 0# 1 x 12  3# 1 x 6 (DC/d)  1# 2 x 10  3# 2x15 3# 2x15 3# 2x15   Bent over rows 2# 2x15 3# 2x12 5# 2x15  - - -   Bent over shldr extn 2# 2x15 3# 2x12 5# 2x15  - - -   Bent over mid trap  2# 2x12 3# 2x15 Side lying  2 x 10 0# L    Bentover  0# 2 x 10 L - - -   Bent over low trap  2# 2x12 3# 2x15 Side-lying  2 x 10 0# L - - -   B bicep curls 2# 2x15 3# 2x15 5# 2x15  5# 2x15 5# 2x15 5# 2x15   Wall push ups - - 1x10  2x15 -    IR with band - - Red 2x15 Resisted flexion, yellow 3 x 10  Red 2x15 Red 2x15 Red 2x15   ER with band - - Red 2x15  Red 2x15 Red 2x15 Red 2x15   B Lat pull downs 7# 2x15 7# 2x15 -  10# 2x15 10# 2x15 10# 2x15   D1/D2 diagonals on wall - - 1x10 ea  - - 2x10 ea   B cable rows 7# 2x15 7# 2x15 - 7# 3 x 10 R 7# 2x15 10# 2x15 10# 2x15   B cable press 3# 2x15 3# 2x15 -  - 3# 2x15 3# 2x15   B shldr flex with symmetry 1 x 10 - -  - 1# 2x10 -   B shldr abduct to 90 degrees      1# 2x10 -       HEP: continue current HEP  MedBridge Portal    Treatment/Session Summary:    · Response to Treatment:  Patient continues to work hard on strengthening but he had increased soreness today after EMG study earlier. · Communication/Consultation:  None today  · Equipment provided today:  None today  · Recommendations/Intent for next treatment session: Next visit will focus on shoulder ROM and strengthening.      Total Treatment Billable Duration:  40 minutes  PT Patient Time In/Time Out  Time In: 0271  Time Out: 1509    Luc Pride, PT    Future Appointments   Date Time Provider Frank Branham   3/23/2021  2:15 PM Luis Byers, PT [facility]   3/25/2021 11:15 AM Lopez Causey, [facility]   3/30/2021  1:00 PM RAMYA Jamil [facility]   4/1/2021 11:15 AM [facility]   4/6/2021  9:15 AM Posta, Mortimer Lies., MD [facility]   4/6/2021 10:30 AM [facility]

## 2021-03-23 ENCOUNTER — HOSPITAL ENCOUNTER (OUTPATIENT)
Dept: PHYSICAL THERAPY | Age: 76
Discharge: HOME | End: 2021-03-23
Payer: MEDICARE

## 2021-03-23 PROCEDURE — 97140 MANUAL THERAPY 1/> REGIONS: CPT

## 2021-03-23 PROCEDURE — 97110 THERAPEUTIC EXERCISES: CPT

## 2021-03-23 NOTE — PROGRESS NOTES
Mango Cespedes  : 1945  [place] / Product Type: Medicare /  [address] at [facility]  [address] [facility], [address], [place] [address].  [phone]   [phone]       OUTPATIENT PHYSICAL THERAPY: Daily Treatment Note 3/23/2021  Visit Count: 18  ICD-10: Treatment Diagnosis: Stiffness of left shoulder, not elsewhere classified M25. 612;  Muscle weakness (generalized) M62. 81,Z96. 612 - Presence of left artificial shoulder joint.     Precautions/Allergies:   Celebrex [celecoxib] and Robaxin [methocarbamol]   TREATMENT PLAN:  Effective Dates: 2020 TO 3/28/2021 (90 days). Frequency/Duration: 2 times a week for 90 Day(s) MEDICAL/REFERRING DIAGNOSIS:  L shoulder   DATE OF ONSET: 12/10/20  REFERRING PHYSICIAN: Marianne Antunez MD MD Orders: eval and treat HEP,ROM, strengthening, full motion/full strength (writtten 21)  Return MD Appointment: 21            Pre-treatment Symptoms/Complaints:  Patient reports he still has trouble when he tries to supinated his L arm. Pain: Initial: Pain Intensity 1: 0   Post Session:  Not rated   Medications Last Reviewed: 3/23/21      Updated Objective Findings:   None today     TREATMENT:     Manual Therapy ( 15 minutes) - for motion - grade 2 to 4- physio mobs L shoulder flex, abduct, IR and ER at multiple angles. Grade 2 to 4- inferior and posterior shoulder glides. Therapeutic Exercise: (25 Minutes  ):  Exercises per grid below to improve mobility and strength. Required moderate verbal and tactile cues to ensure correct performance.            Date  3/2/21 Date  3/9/21 Date  3/11/21 Date  3/16/21 Date  3/18/21 Date  3/23/21   Activity/Exercise         B serratus punch 3# 2x15 Dynamic hug, blue, bilateral  3 x 10  3# 2x15 4# 2x15 4# 2x15 4# 2x15   Side lying abduct 3# 2x15 Standing scaption  2# 3 x 10 L   3# 2x15 4# 2x15 4# 1x15  3# 1x15 3# 1x15   Side lying ER 3# 2x15 0# 1 x 12  3# 1 x 6 (DC/d)  1# 2 x 10  3# 2x15 3# 2x15 3# 2x15 3# 2x15   Bent over rows 5# 2x15  - - - 5# 2x15   Bent over shldr extn 5# 2x15  - - - 5# 2x15   Bent over mid trap 3# 2x15 Side lying  2 x 10 0# L    Bentover  0# 2 x 10 L - - - 3# 2x15   Bent over low trap 3# 2x15 Side-lying  2 x 10 0# L - - - 3# 2x15   B bicep curls 5# 2x15  5# 2x15 5# 2x15 5# 2x15 -   Wall push ups 1x10  2x15 -  2x10   IR with band Red 2x15 Resisted flexion, yellow 3 x 10  Red 2x15 Red 2x15 Red 2x15 Red 2x15   ER with band Red 2x15  Red 2x15 Red 2x15 Red 2x15 Red 2x15   B Lat pull downs -  10# 2x15 10# 2x15 10# 2x15 -   D1/D2 diagonals on wall 1x10 ea  - - 2x10 ea 1# 2x7 ea   B cable rows - 7# 3 x 10 R 7# 2x15 10# 2x15 10# 2x15 -   B cable press -  - 3# 2x15 3# 2x15 -   B shldr flex with symmetry -  - 1# 2x10 - -   B shldr abduct to 90 degrees    1# 2x10 - -       HEP: continue current HEP  MedBridge Portal    Treatment/Session Summary:    · Response to Treatment:  Patient continues to work hard on strengthening but remains weak in L shoulder. · Communication/Consultation:  None today  · Equipment provided today:  None today  · Recommendations/Intent for next treatment session: Next visit will focus on shoulder ROM and strengthening.      Total Treatment Billable Duration:  40 minutes  PT Patient Time In/Time Out  Time In: 3105  Time Out: Luci Blunt PT    Future Appointments   Date Time Provider Frank Branham   3/25/2021 11:15 AM Nirmal Abraham, PT [facility]   3/30/2021  1:00 PM Nirmal Abraham, PT [facility]   4/1/2021 11:15 AM Nirmal Abraham, PT [facility]   4/6/2021  9:15 AM Alina Kwon MD [facility]   4/6/2021 10:30 AM Nirmal Abraham PT [facility]

## 2021-03-25 ENCOUNTER — HOSPITAL ENCOUNTER (OUTPATIENT)
Dept: PHYSICAL THERAPY | Age: 76
Discharge: HOME | End: 2021-03-25
Payer: MEDICARE

## 2021-03-25 PROCEDURE — 97110 THERAPEUTIC EXERCISES: CPT

## 2021-03-25 PROCEDURE — 97140 MANUAL THERAPY 1/> REGIONS: CPT

## 2021-03-25 NOTE — PROGRESS NOTES
Benita Rodriguez  : 1945  [place] / Product Type: Medicare /  [address]  at [facility]  [address] [facility], [address], [place] [address].  [phone]   [phone]       OUTPATIENT PHYSICAL THERAPY: Daily Treatment Note 3/25/2021  Visit Count: 19  ICD-10: Treatment Diagnosis: Stiffness of left shoulder, not elsewhere classified M25. 612;  Muscle weakness (generalized) M62. 81,Z96. 612 - Presence of left artificial shoulder joint.     Precautions/Allergies:   Celebrex [celecoxib] and Robaxin [methocarbamol]   TREATMENT PLAN:  Effective Dates: 2020 TO 3/28/2021 (90 days). Frequency/Duration: 2 times a week for 90 Day(s) MEDICAL/REFERRING DIAGNOSIS:  L shoulder   DATE OF ONSET: 12/10/20  REFERRING PHYSICIAN: Vikki Zambrano MD MD Orders: eval and treat HEP,ROM, strengthening, full motion/full strength (writtten 21)  Return MD Appointment: 21            Pre-treatment Symptoms/Complaints:  Patient reports he is about the same. No new problems. Pain: Initial: Pain Intensity 1: 0   Post Session:  No pain, just fatigue. Medications Last Reviewed: 3/23/21      Updated Objective Findings:   None today     TREATMENT:     Manual Therapy ( 15 minutes) - for motion - grade 2 to 4- physio mobs L shoulder flex, abduct, IR and ER at multiple angles. Grade 2 to 4- inferior and posterior shoulder glides. Therapeutic Exercise: (25 Minutes  ):  Exercises per grid below to improve mobility and strength. Required minimal verbal and tactile cues to ensure correct performance.            Date  3/11/21 Date  3/16/21 Date  3/18/21 Date  3/23/21 Date  3/25/21   Activity/Exercise        B serratus punch 3# 2x15 4# 2x15 4# 2x15 4# 2x15 4# 2x15   Side lying abduct 3# 2x15 4# 2x15 4# 1x15  3# 1x15 3# 1x15 3# 2x15   Side lying ER 3# 2x15 3# 2x15 3# 2x15 3# 2x15 3# 2x15   Bent over rows - - - 5# 2x15 5# 2x15   Bent over shldr extn - - - 5# 2x15 5# 2x15   Bent over mid trap - - - 3# 2x15 3# 2x15   Bent over low trap - - - 3# 2x15 3# 2x15   B bicep curls 5# 2x15 5# 2x15 5# 2x15 - -   Wall push ups 2x15 -  2x10 2x10   IR with band Red 2x15 Red 2x15 Red 2x15 Red 2x15 Green 2x15   ER with band Red 2x15 Red 2x15 Red 2x15 Red 2x15 Green 2x15   B Lat pull downs 10# 2x15 10# 2x15 10# 2x15 - 13# 2x15   D1/D2 diagonals on wall - - 2x10 ea 1# 2x7 ea -   B cable rows 7# 2x15 10# 2x15 10# 2x15 - 13# 2x15   B cable press - 3# 2x15 3# 2x15 - -   B shldr flex with symmetry - 1# 2x10 - - 1# 1x10   B shldr abduct to 90 degrees  1# 2x10 - - 1# 2x10       HEP: continue current HEP  MedBridge Portal    Treatment/Session Summary:    · Response to Treatment:  Patient continues to work hard on strengthening. Seems to be weakest in ER, especially during shoulder elevation. · Communication/Consultation:  None today  · Equipment provided today:  None today  · Recommendations/Intent for next treatment session: Next visit will focus on shoulder ROM and strengthening.      Total Treatment Billable Duration:  40 minutes  PT Patient Time In/Time Out  Time In: 1116  Time Out: Vaibhav Green 44 Madhu Degroot PT    Future Appointments   Date Time Provider Frank Branham   3/30/2021  1:00 PM Erendira Bauer [facility]   4/1/2021 11:15 AM RAMYA Bauer [facility]   4/6/2021  9:15 AM Sandy Kwon MD [facility]   4/6/2021 10:30 AM Esther Case, RAMYA HENAO [facility]

## 2021-03-29 ENCOUNTER — HOSPITAL ENCOUNTER (OUTPATIENT)
Dept: PHYSICAL THERAPY | Age: 76
Discharge: HOME | End: 2021-03-29
Payer: MEDICARE

## 2021-03-29 PROCEDURE — 97140 MANUAL THERAPY 1/> REGIONS: CPT

## 2021-03-29 PROCEDURE — 97110 THERAPEUTIC EXERCISES: CPT

## 2021-03-29 NOTE — THERAPY RECERTIFICATION
Macario Loo : 1945 [place] Secondary: Morris Jacob at [facility] [address] [facility], [address] [place] [address],[address], [facility] [address] [phone]   [phone] OUTPATIENT PHYSICAL THERAPY:Recertification  ICD-10: Treatment Diagnosis: Stiffness of left shoulder, not elsewhere classified M25. 612, Muscle weakness (generalized) M62. 81,Z96. 612 - Presence of left artificial shoulder joint. Precautions/Allergies:  
Celebrex [celecoxib] and Robaxin [methocarbamol] TREATMENT PLAN: 
Effective Dates: 3/29/2021 TO 2021 (60 days). Frequency/Duration: 2 times a week for 60 Day(s) Patient has attended 20 PT sessions from 20 to 3/29/21 with 3 cancelled sessions MEDICAL/REFERRING DIAGNOSIS: 
L shoulder DATE OF ONSET: 12/10/20 REFERRING PHYSICIAN: Victorino Kwon MD MD Orders: eval and treat HEP,ROM, strengthening, full motion/full strength (writtten 21) Return MD Appointment: 21 ASSESSMENT:  Mr. Seymour Pisano presented to PT 18 days following reverse L shoulder replacement. Patient was having difficulty with positioning and had decreased ROM, strengthening and use of L UE following procedure. He has improved greatly in ROM but he is still weak in active elevation of L UE. DASH score is improved again He could benefit from continued PT to address deficits and work towards goals. Genaro Walls PROBLEM LIST (Impacting functional limitations): 1. Decreased Strength 2. Increased Pain 3. Decreased Activity Tolerance 4. Decreased Flexibility/Joint Mobility 5. Decreased [place] with Home Exercise Program INTERVENTIONS PLANNED: (Treatment may consist of any combination of the following) 1. Cold 2. Home Exercise Program (HEP) 3. Manual Therapy 4. Range of Motion (ROM) 5. Therapeutic Exercise/Strengthening GOALS: (Goals have been discussed and agreed upon with patient.) Short-Term Functional Goals: Time Frame: 30 days 1. Shoulder PROM 120 or greater, elbow ext to 0. - MET for shoulder flex (2/11/21) 2. Improved sleep with better positioning. - patient reports he still has trouble sleeping (2/11/21) 3. Independent with HEP - MET (2/11/21) Discharge Goals: Time Frame: 60 days -  all in progress 1. Elevation of UE to 120 - MET (3/29/21) 2. Independent with HEP to maintain improvements and return to gym workout (He has returned to gym, mostly doing LE workouts. 2/11/21) 3. Improve DASH to 25 or better indicating improved function. OUTCOME MEASURE:  
Tool Used: Disabilities of the Arm, Shoulder and Hand (DASH) Questionnaire - Quick Version Score:  Initial: 39/55  Most Recent: 31/55 (Date: 2/11/21 )     Most Recent: 29/55 (Date 3/29/21) Interpretation of Score: The DASH is designed to measure the activities of daily living in person's with upper extremity dysfunction or pain. Each section is scored on a 1-5 scale, 5 representing the greatest disability. The scores of each section are added together for a total score of 55. MEDICAL NECESSITY:  
· Patient is expected to demonstrate progress in strength, range of motion, coordination and functional technique to return function of L UE. Melissa Currie REASON FOR SERVICES/OTHER COMMENTS: 
· Patient continues to require skilled intervention due to recent L reverse total shoulder replacement. Melissa Currie Regarding Ted De Leon's therapy, I certify that the treatment plan above will be carried out by a therapist or under their direction. Thank you for this referral, 
Edwin Naranjo PT Referring Physician Signature: Sen Braden MD _______________________________ Date _____________ Data from initial evaluation unless otherwise indicated. PAIN/SUBJECTIVE:  
Initial: Pain Intensity 1: 0   Post Session:  \"3 after working out\"  (2/11/21) HISTORY:  
History of Injury/Illness (Reason for Referral): 
Pt had L reverse total shoulder Past Medical History/Comorbidities: from EMR Mr. Katalina Celestin  has a past medical history of CAD (coronary artery disease), Chronic venous insufficiency, Diabetes ([address] GERD (gastroesophageal reflux disease), High cholesterol, Hypertension, Osteoarthritis, and Seasonal allergic rhinitis. Mr. Katalina Celestin  has a past surgical history that includes hx hernia repair (Right, 2002); hx vasectomy (2004); hx knee arthroscopy (Right, 2009); hx cataract removal (Bilateral, 2013); hx heent (Left, 1975); hx rotator cuff repair (Right, 2015); hx hip replacement (Right, 01/2016); hx hip replacement (Left, 03/2017); and hx tonsillectomy. Social History/Living Environment:  
  lives with wife in home with some steps Prior Level of Function/Work/Activity: 
Works out at gym with weights daily until surgery Current Medications:  From EMR Current Outpatient Medications:  
  acetaminophen (TYLENOL) 500 mg tablet, Take 1,000 mg by mouth every six (6) hours as needed for Pain., Disp: , Rfl:  
  omega-3 fatty acids-fish oil (Fish Oil) 360-1,200 mg cap, Take 1 Cap by mouth daily. , Disp: , Rfl:  
  metFORMIN (GLUMETZA ER) 500 mg TG24 24 hour tablet, Take 1,500 mg by mouth daily. Indications: type 2 diabetes mellitus, Disp: , Rfl:  
  cyanocobalamin (VITAMIN B12) 100 mcg tablet, Take 125 mcg by mouth daily. , Disp: , Rfl:  
  montelukast (SINGULAIR) 10 mg tablet, Take 10 mg by mouth nightly. Indications: seasonal runny nose, Disp: , Rfl:  
  azelastine (ASTELIN) 137 mcg (0.1 %) nasal spray, 1 [place] by Both Nostrils route every twelve (12) hours. . , Disp: , Rfl:  
  multivitamin (ONE A DAY) tablet, Take 1 Tab by mouth daily. , Disp: , Rfl:  
  COQ10, UBIQUINOL, PO, Take 100 mg by mouth daily. , Disp: , Rfl:  
  ramipril (ALTACE) 5 mg capsule, Take 5 mg by mouth nightly. Indications: high blood pressure, Disp: , Rfl:  
  atorvastatin (LIPITOR) 20 mg tablet, Take 20 mg by mouth nightly.  Indications: high cholesterol, Disp: , Rfl:  
 psyllium husk, with sugar, 3.4 gram pwpk, Take  by mouth daily. 1 tsp daily, Disp: , Rfl:  
  cholecalciferol (VITAMIN D3) 1,000 unit tablet, Take 5,000 Units by mouth nightly., Disp: , Rfl:   
  
Date Last Reviewed:  3/29/21 EXAMINATION:  
Observation/Orthostatic Postural Assessment:   
      Patient in no acute distress ROM:  3/29/21 LUE AROM 
L Shoulder Flexion: 145 L Shoulder Extension: 45 L Shoulder ABduction: 125 L Shoulder Internal Rotation: (thumb to back pocket) L Shoulder External Rotation: 30 
LUE PROM 
L Shoulder Flexion: 150 L Shoulder ABduction: 140 L Shoulder Internal Rotation: 70(at 80 degrees abduct) L Shoulder External Rotation: 70(at 80 degrees abduct) Strength:     Not tested on 3/29/21

## 2021-03-29 NOTE — PROGRESS NOTES
Shanelle Cuevas  : 1945  [place] / Product Type: Medicare /  [address]  at [address] [place] [address] Rd  [address] [facility] ([place], [place]), [address], [place] [address].  [phone]   [phone]       OUTPATIENT PHYSICAL THERAPY: Daily Treatment Note 3/29/2021  Visit Count: 20  ICD-10: Treatment Diagnosis: Stiffness of left shoulder, not elsewhere classified M25. 612;  Muscle weakness (generalized) M62. 81,Z96. 612 - Presence of left artificial shoulder joint.     Precautions/Allergies:   Celebrex [celecoxib] and Robaxin [methocarbamol]   TREATMENT PLAN:  Effective Dates: 3/29/2021 TO 2021 (60 days). Frequency/Duration: 2 times a week for 60 Day(s) MEDICAL/REFERRING DIAGNOSIS:  L shoulder   DATE OF ONSET: 12/10/20  REFERRING PHYSICIAN: Priscilla Barrios MD MD Orders: eval and treat HEP,ROM, strengthening, full motion/full strength (writtten 21)  Return MD Appointment: 21            Pre-treatment Symptoms/Complaints:  Patient reports no new problems. He returns to MD next week. Pain: Initial: Pain Intensity 1: 0   Post Session:  No pain,, just achiness. Medications Last Reviewed: 3/29/21      Updated Objective Findings:   See Recertification Note     TREATMENT:     Manual Therapy ( 15 minutes) - for motion - grade 2 to 4- physio mobs L shoulder flex, abduct, IR and ER at multiple angles. Grade 2 to 4- inferior and posterior shoulder glides. Therapeutic Exercise: (25 Minutes  ):  Exercises per grid below to improve mobility and strength. Required minimal verbal and tactile cues to ensure correct performance.            Date  3/11/21 Date  3/16/21 Date  3/18/21 Date  3/23/21 Date  3/25/21 Date  3/29/21   Activity/Exercise         B serratus punch 3# 2x15 4# 2x15 4# 2x15 4# 2x15 4# 2x15 4# 2x15   Side lying abduct 3# 2x15 4# 2x15 4# 1x15  3# 1x15 3# 1x15 3# 2x15 3# 2x15   Side lying ER 3# 2x15 3# 2x15 3# 2x15 3# 2x15 3# 2x15 3# 2x15   Bent over rows - - - 5# 2x15 5# 2x15 5# 2x15   Bent over shldr extn - - - 5# 2x15 5# 2x15 5# 2x15   Bent over mid trap - - - 3# 2x15 3# 2x15 3# 2x15   Bent over low trap - - - 3# 2x15 3# 2x15 3# 2x15   B bicep curls 5# 2x15 5# 2x15 5# 2x15 - - -   Wall push ups 2x15 -  2x10 2x10 2x10   IR with band Red 2x15 Red 2x15 Red 2x15 Red 2x15 Green 2x15 Green 2x15   ER with band Red 2x15 Red 2x15 Red 2x15 Red 2x15 Green 2x15 Green 2x15   B Lat pull downs 10# 2x15 10# 2x15 10# 2x15 - 13# 2x15 13# 2x15   D1/D2 diagonals on wall - - 2x10 ea 1# 2x7 ea - -   B cable rows 7# 2x15 10# 2x15 10# 2x15 - 13# 2x15 13# 2x15   B cable press - 3# 2x15 3# 2x15 - - -   B shldr flex with symmetry - 1# 2x10 - - 1# 1x10 1# 2x10   B shldr abduct to 90 degrees  1# 2x10 - - 1# 2x10 1# 2x10       HEP: continue current HEP  MedBridge Portal    Treatment/Session Summary:    · Response to Treatment:  Patient continues to work hard on strengthening and has improved in AROM since last measured. See Recertification Note. · Communication/Consultation:  None today  · Equipment provided today:  None today  · Recommendations/Intent for next treatment session: Next visit will focus on shoulder ROM and strengthening.      Total Treatment Billable Duration:  40 minutes  PT Patient Time In/Time Out  Time In: 0151  Time Out: 1333    Maurisio Jasmine PT    Future Appointments   Date Time Provider Frank Carrol   4/1/2021 11:15 AM Marcia Hendricks, PT [facility]   4/6/2021  9:15 AM Emanuel Kwon MD [facility]   4/6/2021 10:30 AM Michael Zhang, PT [facility]

## 2021-04-01 ENCOUNTER — HOSPITAL ENCOUNTER (OUTPATIENT)
Dept: PHYSICAL THERAPY | Age: 76
Discharge: HOME | End: 2021-04-01
Payer: MEDICARE

## 2021-04-01 PROCEDURE — 97140 MANUAL THERAPY 1/> REGIONS: CPT

## 2021-04-01 PROCEDURE — 97110 THERAPEUTIC EXERCISES: CPT

## 2021-04-01 NOTE — PROGRESS NOTES
Jana Turk  : 1945  [place] / Product Type: Medicare /  [address] at [facility] [place]  [address] [facility], [address], [place] [address].  [phone]   [phone]       OUTPATIENT PHYSICAL THERAPY: Daily Treatment Note 2021  Visit Count: 21  ICD-10: Treatment Diagnosis: Stiffness of left shoulder, not elsewhere classified M25. 612;  Muscle weakness (generalized) M62. 81,Z96. 612 - Presence of left artificial shoulder joint.     Precautions/Allergies:   Celebrex [celecoxib] and Robaxin [methocarbamol]   TREATMENT PLAN:  Effective Dates: 3/29/2021 TO 2021 (60 days). Frequency/Duration: 2 times a week for 60 Day(s) MEDICAL/REFERRING DIAGNOSIS:  L shoulder   DATE OF ONSET: 12/10/20  REFERRING PHYSICIAN: Ricci Guerrero MD MD Orders: eval and treat HEP,ROM, strengthening, full motion/full strength (writtten 21)  Return MD Appointment: 21            Pre-treatment Symptoms/Complaints:  Patient reports no new problems. He returns to MD on Tuesday. Notes that he got his second vaccination yesterday and did not have a reaction. Pain: Initial: Pain Intensity 1: 0   Post Session:  No pain, just achiness. Medications Last Reviewed: 3/29/21      Updated Objective Findings:   See Recertification Note     TREATMENT:     Manual Therapy ( 15 minutes) - for motion - grade 2 to 4- physio mobs L shoulder flex, abduct, IR and ER at multiple angles. Grade 2 to 4- inferior and posterior shoulder glides. Therapeutic Exercise: (25 Minutes  ):  Exercises per grid below to improve mobility and strength. Required minimal verbal and tactile cues to ensure correct performance.            Date  3/11/21 Date  3/16/21 Date  3/18/21 Date  3/23/21 Date  3/25/21 Date  3/29/21 Date  21   Activity/Exercise          B serratus punch 3# 2x15 4# 2x15 4# 2x15 4# 2x15 4# 2x15 4# 2x15 6# 2x15   Side lying abduct 3# 2x15 4# 2x15 4# 1x15  3# 1x15 3# 1x15 3# 2x15 3# 2x15 4# 2x15   Side lying ER 3# 2x15 3# 2x15 3# 2x15 3# 2x15 3# 2x15 3# 2x15 3# 2x15   Bent over rows - - - 5# 2x15 5# 2x15 5# 2x15 6# 2x15   Bent over shldr extn - - - 5# 2x15 5# 2x15 5# 2x15 6# 2x15   Bent over mid trap - - - 3# 2x15 3# 2x15 3# 2x15 3# 2x15   Bent over low trap - - - 3# 2x15 3# 2x15 3# 2x15 3# 2x15   B bicep curls 5# 2x15 5# 2x15 5# 2x15 - - - 6# 1x15  8# 1x15   Wall push ups 2x15 -  2x10 2x10 2x10 -   IR with band Red 2x15 Red 2x15 Red 2x15 Red 2x15 Green 2x15 Green 2x15 Green 2x15   ER with band Red 2x15 Red 2x15 Red 2x15 Red 2x15 Green 2x15 Green 2x15 Green 2x15   B Lat pull downs 10# 2x15 10# 2x15 10# 2x15 - 13# 2x15 13# 2x15    D1/D2 diagonals on wall - - 2x10 ea 1# 2x7 ea - -    B cable rows 7# 2x15 10# 2x15 10# 2x15 - 13# 2x15 13# 2x15    B cable press - 3# 2x15 3# 2x15 - - -    B shldr flex with symmetry - 1# 2x10 - - 1# 1x10 1# 2x10 2# 2x10   B shldr abduct to 90 degrees  1# 2x10 - - 1# 2x10 1# 2x10 2# 2x10   Wall dribble flexion       0.5 kg 1 x15 *   * unable to do more    HEP: continue current HEP  MedBridge Portal    Treatment/Session Summary:    · Response to Treatment:  Patient was unable to complete wall dribbles due to fatigue in arm. · Communication/Consultation:  None today  · Equipment provided today:  None today  · Recommendations/Intent for next treatment session: Next visit will focus on shoulder ROM and strengthening.      Total Treatment Billable Duration:  40 minutes  PT Patient Time In/Time Out  Time In: 1120  Time Out: 1201    Alton Arizmendi, RAMYA    Future Appointments   Date Time Provider Frank Branham   4/6/2021  9:15 AM Posta, Rubbie Ganser., MD [facility]   4/6/2021 10:30 AM [facility]ena Shi, [facility]

## 2021-04-06 ENCOUNTER — HOSPITAL ENCOUNTER (OUTPATIENT)
Dept: PHYSICAL THERAPY | Age: 76
Discharge: HOME | End: 2021-04-06
Payer: MEDICARE

## 2021-04-06 PROCEDURE — 97140 MANUAL THERAPY 1/> REGIONS: CPT

## 2021-04-06 PROCEDURE — 97110 THERAPEUTIC EXERCISES: CPT

## 2021-04-06 NOTE — PROGRESS NOTES
Talia Baker  : 1945  [place] / Product Type: Medicare /  [address] at [facility] [place]  [address] [facility], [address], [place] [address].  [phone]   [phone]       OUTPATIENT PHYSICAL THERAPY: Daily Treatment Note 2021  Visit Count: 22  ICD-10: Treatment Diagnosis: Stiffness of left shoulder, not elsewhere classified M25. 612;  Muscle weakness (generalized) M62. 81,Z96. 612 - Presence of left artificial shoulder joint.     Precautions/Allergies:   Celebrex [celecoxib] and Robaxin [methocarbamol]   TREATMENT PLAN:  Effective Dates: 3/29/2021 TO 2021 (60 days). Frequency/Duration: 2 times a week for 60 Day(s) MEDICAL/REFERRING DIAGNOSIS:  L shoulder   DATE OF ONSET: 12/10/20  REFERRING PHYSICIAN: Juana Maher MD MD Orders: eval and treat HEP,ROM, strengthening, full motion/full strength (writtten 21)  Return MD Appointment: 21            Pre-treatment Symptoms/Complaints:  Patient reports no new problems. He returned to MD and is to continue with HEP on his own after today. He reports that MD was pleased with his progress. Pain: Initial: Pain Intensity 1: 0   Post Session:  No pain reported   Medications Last Reviewed: 21      Updated Objective Findings:   None     TREATMENT:     Manual Therapy ( 15 minutes) - for motion - grade 2 to 4- physio mobs L shoulder flex, abduct, IR and ER at multiple angles. Grade 2 to 4- inferior and posterior shoulder glides. Therapeutic Exercise: (25 Minutes  ):  Exercises per grid below to improve mobility and strength. Required minimal verbal and tactile cues to ensure correct performance.            Date  3/18/21 Date  3/23/21 Date  3/25/21 Date  3/29/21 Date  21 Date  21   Activity/Exercise         B serratus punch 4# 2x15 4# 2x15 4# 2x15 4# 2x15 6# 2x15 5# 2x15   Side lying abduct 4# 1x15  3# 1x15 3# 1x15 3# 2x15 3# 2x15 4# 2x15 4# 2x15   Side lying ER 3# 2x15 3# 2x15 3# 2x15 3# 2x15 3# 2x15 3# 2x15   Bent over rows - 5# 2x15 5# 2x15 5# 2x15 6# 2x15 6# 2x15   Bent over shldr extn - 5# 2x15 5# 2x15 5# 2x15 6# 2x15 6# 2x15   Bent over mid trap - 3# 2x15 3# 2x15 3# 2x15 3# 2x15 3# 2x15   Bent over low trap - 3# 2x15 3# 2x15 3# 2x15 3# 2x15 3# 2x15   B bicep curls 5# 2x15 - - - 6# 1x15  8# 1x15 8# 2x15   Wall push ups  2x10 2x10 2x10 - 2x10   IR with band Red 2x15 Red 2x15 Green 2x15 Green 2x15 Green 2x15 Green 2x15   ER with band Red 2x15 Red 2x15 Green 2x15 Green 2x15 Green 2x15 Green 2x15   B Lat pull downs 10# 2x15 - 13# 2x15 13# 2x15  -   D1/D2 diagonals on wall 2x10 ea 1# 2x7 ea - -  -   B cable rows 10# 2x15 - 13# 2x15 13# 2x15  -   B cable press 3# 2x15 - - -  -   B shldr flex with symmetry - - 1# 1x10 1# 2x10 2# 2x10 2# 2x10   B shldr abduct to 90 degrees - - 1# 2x10 1# 2x10 2# 2x10 2# 2x10   Wall dribble flexion     0.5 kg 1 x15 * -   * unable to do more    HEP: continue current HEP  MedBridge Portal    Treatment/Session Summary:    · Response to Treatment:  Patient has progressed well and is now released from PT by MD.    · Communication/Consultation:  Discharge Summary sent to MD  · Equipment provided today:  Green tubing and blue tubing issued to patient for HEP  · Recommendations/Intent for next treatment session: None    Total Treatment Billable Duration:  40 minutes  PT Patient Time In/Time Out  Time In: 1010  Time Out: [address] [facility] Sharla Nageotte, PT    Future Appointments   Date Time Provider Frank Branham   5/18/2021 10:15 AM Postgrupo, Palma Ulloa MD [facility]

## 2021-04-06 NOTE — THERAPY DISCHARGE
Macario Loo : 1945 [place] Secondary: Morris Jacob at formerly [facility] [place] [address] [facility], [address] [place] [address],[address], [address] [facility] [phone]   [phone] OUTPATIENT PHYSICAL THERAPY:Discharge Summary 2021 ICD-10: Treatment Diagnosis: Stiffness of left shoulder, not elsewhere classified M25. 612, Muscle weakness (generalized) M62. 81,Z96. 612 - Presence of left artificial shoulder joint. Precautions/Allergies:  
Celebrex [celecoxib] and Robaxin [methocarbamol] TREATMENT PLAN:  Discharge PT Patient has attended 22 PT sessions from 20 to 21 with 3 cancelled sessions MEDICAL/REFERRING DIAGNOSIS: 
L shoulder DATE OF ONSET: 12/10/20 REFERRING PHYSICIAN: Victorino Kwon MD MD Orders: eval and treat HEP,ROM, strengthening, full motion/full strength (writtten 21) Return MD Appointment: 21 ASSESSMENT:  Mr. Seymour Pisano presented to PT 18 days following reverse L shoulder replacement. Patient was having difficulty with positioning and had decreased ROM, strengthening and use of L UE following procedure. He improved greatly in ROM but strength was improving. DASH score was improved again  He returned to MD and was released from PT. He will now be discharge from PT. .   
 
GOALS: (Goals have been discussed and agreed upon with patient.) Short-Term Functional Goals: Time Frame: 30 days 1. Shoulder PROM 120 or greater, elbow ext to 0. - MET for shoulder flex (21) 2. Improved sleep with better positioning. - patient reports he still has trouble sleeping (21) 3. Independent with HEP - MET (21) Discharge Goals: Time Frame: 60 days - 1. Elevation of UE to 120 - MET (3/29/21) 2. Independent with HEP to maintain improvements and return to gym workout  MET 3. Improve DASH to 25 or better indicating improved function.  Mostly MET 
 
 
OUTCOME MEASURE:  
Tool Used: Disabilities of the Arm, Shoulder and Hand (DASH) Questionnaire - Quick Version Score:  Initial: 39/55  Most Recent: 31/55 (Date: 2/11/21 )     Most Recent: 29/55 (Date 3/29/21) Interpretation of Score: The DASH is designed to measure the activities of daily living in person's with upper extremity dysfunction or pain. Each section is scored on a 1-5 scale, 5 representing the greatest disability. The scores of each section are added together for a total score of 55. Data from initial evaluation unless otherwise indicated. PAIN/SUBJECTIVE:  
Initial: Pain Intensity 1: 0   Post Session:  No increase  (4/6/21) HISTORY:  
History of Injury/Illness (Reason for Referral): 
Pt had L reverse total shoulder Past Medical History/Comorbidities: from EMR Mr. Hoda Perez  has a past medical history of CAD (coronary artery disease), Chronic venous insufficiency, Diabetes ([facility] [address] GERD (gastroesophageal reflux disease), High cholesterol, Hypertension, Osteoarthritis, and Seasonal allergic rhinitis. Mr. Hoda Perez  has a past surgical history that includes hx hernia repair (Right, 2002); hx vasectomy (2004); hx knee arthroscopy (Right, 2009); hx cataract removal (Bilateral, 2013); hx heent (Left, 1975); hx rotator cuff repair (Right, 2015); hx hip replacement (Right, 01/2016); hx hip replacement (Left, 03/2017); and hx tonsillectomy. Social History/Living Environment:  
  lives with wife in home with some steps Prior Level of Function/Work/Activity: 
Works out at gym with weights daily until surgery Current Medications:  From EMR Current Outpatient Medications:  
  acetaminophen (TYLENOL) 500 mg tablet, Take 1,000 mg by mouth every six (6) hours as needed for Pain., Disp: , Rfl:  
  omega-3 fatty acids-fish oil (Fish Oil) 360-1,200 mg cap, Take 1 Cap by mouth daily. , Disp: , Rfl:  
  metFORMIN (GLUMETZA ER) 500 mg TG24 24 hour tablet, Take 1,500 mg by mouth daily.  Indications: type 2 diabetes mellitus, Disp: , Rfl:  
  cyanocobalamin (VITAMIN B12) 100 mcg tablet, Take 125 mcg by mouth daily. , Disp: , Rfl:  
  montelukast (SINGULAIR) 10 mg tablet, Take 10 mg by mouth nightly. Indications: seasonal runny nose, Disp: , Rfl:  
  azelastine (ASTELIN) 137 mcg (0.1 %) nasal spray, 1 [place] by Both Nostrils route every twelve (12) hours. . , Disp: , Rfl:  
  multivitamin (ONE A DAY) tablet, Take 1 Tab by mouth daily. , Disp: , Rfl:  
  COQ10, UBIQUINOL, PO, Take 100 mg by mouth daily. , Disp: , Rfl:  
  ramipril (ALTACE) 5 mg capsule, Take 5 mg by mouth nightly. Indications: high blood pressure, Disp: , Rfl:  
  atorvastatin (LIPITOR) 20 mg tablet, Take 20 mg by mouth nightly. Indications: high cholesterol, Disp: , Rfl:  
  psyllium husk, with sugar, 3.4 gram pwpk, Take  by mouth daily. 1 tsp daily, Disp: , Rfl:  
  cholecalciferol (VITAMIN D3) 1,000 unit tablet, Take 5,000 Units by mouth nightly., Disp: , Rfl:   
  
Date Last Reviewed:  4/6/21 EXAMINATION:  
Observation/Orthostatic Postural Assessment:   
      Patient in no acute distress ROM:  3/29/21 LUE AROM 
L Shoulder Flexion: 145 L Shoulder Extension: 45 L Shoulder ABduction: 125 L Shoulder Internal Rotation: (thumb to back pocket) L Shoulder External Rotation: 30 
LUE PROM 
L Shoulder Flexion: 150 L Shoulder ABduction: 140 L Shoulder Internal Rotation: 70(at 80 degrees abduct) L Shoulder External Rotation: 70(at 80 degrees abduct) Strength:     Not tested on 3/29/21

## 2021-05-12 NOTE — PROGRESS NOTES
I am accessing Mr. De Leon's chart as a part of our department's internal chart auditing process. I certify that Mr. De Leon is, or was, a patient in our department.   Thank you,  Jus Upton, PT  5/12/2021

## 2022-03-18 PROBLEM — R20.2 PARESTHESIA OF BOTH HANDS: Status: ACTIVE | Noted: 2021-03-18

## 2022-03-19 PROBLEM — M12.812 ROTATOR CUFF TEAR ARTHROPATHY, LEFT: Status: ACTIVE | Noted: 2020-12-05

## 2022-03-19 PROBLEM — M75.102 ROTATOR CUFF TEAR ARTHROPATHY OF LEFT SHOULDER: Status: ACTIVE | Noted: 2020-12-10

## 2022-03-19 PROBLEM — M12.812 ROTATOR CUFF TEAR ARTHROPATHY OF LEFT SHOULDER: Status: ACTIVE | Noted: 2020-12-10

## 2022-03-19 PROBLEM — R29.898 WEAKNESS OF BOTH ARMS: Status: ACTIVE | Noted: 2021-03-18

## 2022-03-19 PROBLEM — M75.102 ROTATOR CUFF TEAR ARTHROPATHY, LEFT: Status: ACTIVE | Noted: 2020-12-05

## 2023-03-22 ENCOUNTER — TELEPHONE (OUTPATIENT)
Dept: ORTHOPEDIC SURGERY | Age: 78
End: 2023-03-22

## 2023-03-22 NOTE — TELEPHONE ENCOUNTER
Called and spoke with patient regarding his wife, told him we recommended Dr. Víctor Brian. Sent a message to [place] at neurology.

## 2023-03-22 NOTE — TELEPHONE ENCOUNTER
Patient wants to know of a neurologist/neurosurg he recommends for his wife, wants to talk to [address] [place] [address]

## (undated) DEVICE — REM POLYHESIVE ADULT PATIENT RETURN ELECTRODE: Brand: VALLEYLAB

## (undated) DEVICE — GUIDEPIN ORTH L190MM DIA2.5MM METAGLENE CTRL FOR DELT XTEND
Type: IMPLANTABLE DEVICE | Site: SHOULDER | Status: NON-FUNCTIONAL
Removed: 2020-12-10

## (undated) DEVICE — GARMENT,MEDLINE,DVT,INT,CALF,MED, GEN2: Brand: MEDLINE

## (undated) DEVICE — SUTURE PROL SZ 2-0 L18IN NONABSORBABLE BLU FS L26MM 3/8 CIR 8685H

## (undated) DEVICE — STOCKINETTE TUBE 6X48 -- MEDICHOICE

## (undated) DEVICE — COVER,MAYO STAND,STERILE: Brand: MEDLINE

## (undated) DEVICE — BUTTON SWITCH PENCIL BLADE ELECTRODE, HOLSTER: Brand: EDGE

## (undated) DEVICE — SINGLE BASIN: Brand: CARDINAL HEALTH

## (undated) DEVICE — MEDI-VAC YANKAUER SUCTION HANDLE W/BULBOUS TIP: Brand: CARDINAL HEALTH

## (undated) DEVICE — PAD,ABDOMINAL,5"X9",ST,LF,25/BX: Brand: MEDLINE INDUSTRIES, INC.

## (undated) DEVICE — SUTURE N ABSRB MONOFILAMENT 5-0 38 IN BLU FORC FIBER 3910900050

## (undated) DEVICE — TOTAL SHOULDER DR POSTA: Brand: MEDLINE INDUSTRIES, INC.

## (undated) DEVICE — GOWN,REINFORCED,POLY,AURORA,XXLARGE,STR: Brand: MEDLINE

## (undated) DEVICE — Device

## (undated) DEVICE — XBRAID #2

## (undated) DEVICE — SUTURE VCRL SZ 0 L27IN ABSRB UD L36MM CP-1 1/2 CIR REV CUT J267H

## (undated) DEVICE — DRAPE,TOP,102X53,STERILE: Brand: MEDLINE

## (undated) DEVICE — BANDAGE,GAUZE,BULKEE II,4.5"X4.1YD,STRL: Brand: MEDLINE

## (undated) DEVICE — SYRINGE CATH TIP 50ML

## (undated) DEVICE — GAUZE,SPONGE,4"X4",16PLY,STRL,LF,10/TRAY: Brand: MEDLINE

## (undated) DEVICE — STRIP,CLOSURE,WOUND,MEDI-STRIP,1/2X4: Brand: MEDLINE

## (undated) DEVICE — SPONGE LAP 18X18IN STRL -- 5/PK

## (undated) DEVICE — SOLUTION IRRIG 3000ML 0.9% SOD CHL FLX CONT 0797208] ICU MEDICAL INC]

## (undated) DEVICE — ARGYLE SIGMOID SURGICAL SUCTION INSTRUMENT 23 FR/CH (7.7 MM): Brand: ARGYLE

## (undated) DEVICE — 3000CC GUARDIAN II: Brand: GUARDIAN

## (undated) DEVICE — GUIDEPIN ORTH L300MM DIA1.5MM GLENOSPHERE FOR DELT XTEND
Type: IMPLANTABLE DEVICE | Site: SHOULDER | Status: NON-FUNCTIONAL
Removed: 2020-12-10

## (undated) DEVICE — (D)PREP SKN CHLRAPRP APPL 26ML -- CONVERT TO ITEM 371833

## (undated) DEVICE — BANDAGE COMPR SELF ADH 5 YDX4 IN TAN STRL PREMIERPRO LF

## (undated) DEVICE — DRAPE TWL SURG 16X26IN BLU ORB04] ALLCARE INC]

## (undated) DEVICE — HANDPIECE SET WITH COAXIAL HIGH FLOW TIP AND SUCTION TUBE: Brand: INTERPULSE

## (undated) DEVICE — XBRAID #5

## (undated) DEVICE — SLING ARM SWTH UNIV FOAM 1 SZ FITS MOST

## (undated) DEVICE — SHEET, DRAPE, SPLIT, STERILE: Brand: MEDLINE

## (undated) DEVICE — PACKING WND W1INXL6FT VAG WVN COT GZ RADPQ

## (undated) DEVICE — INTEGUSEAL MICROBIAL SEALANT: Brand: AVANOS

## (undated) DEVICE — DISPOSABLE DRAPE, STERILE, FOR A CDS-3060 5 FOOT TABLE: Brand: PEDIGO PRODUCTS, INC.

## (undated) DEVICE — OSCILLATING TIP SAW CARTRIDGE: Brand: STRYKER PRECISION